# Patient Record
Sex: FEMALE | Race: WHITE | NOT HISPANIC OR LATINO | Employment: UNEMPLOYED | URBAN - METROPOLITAN AREA
[De-identification: names, ages, dates, MRNs, and addresses within clinical notes are randomized per-mention and may not be internally consistent; named-entity substitution may affect disease eponyms.]

---
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
## 2023-01-04 ENCOUNTER — PATIENT MESSAGE (OUTPATIENT)
Dept: OBSTETRICS AND GYNECOLOGY | Facility: CLINIC | Age: 18
End: 2023-01-04

## 2023-01-06 ENCOUNTER — PATIENT MESSAGE (OUTPATIENT)
Dept: OTHER | Facility: OTHER | Age: 18
End: 2023-01-06
Payer: MEDICAID

## 2023-01-11 ENCOUNTER — PROCEDURE VISIT (OUTPATIENT)
Dept: OBSTETRICS AND GYNECOLOGY | Facility: CLINIC | Age: 18
End: 2023-01-11
Payer: MEDICAID

## 2023-01-11 ENCOUNTER — ROUTINE PRENATAL (OUTPATIENT)
Dept: OBSTETRICS AND GYNECOLOGY | Facility: CLINIC | Age: 18
End: 2023-01-11
Payer: MEDICAID

## 2023-01-11 VITALS — SYSTOLIC BLOOD PRESSURE: 98 MMHG | BODY MASS INDEX: 20.1 KG/M2 | WEIGHT: 124.56 LBS | DIASTOLIC BLOOD PRESSURE: 50 MMHG

## 2023-01-11 DIAGNOSIS — Z36.2 ENCOUNTER FOR FOLLOW-UP ULTRASOUND OF FETAL ANATOMY: Primary | ICD-10-CM

## 2023-01-11 DIAGNOSIS — Z34.81 ENCOUNTER FOR SUPERVISION OF OTHER NORMAL PREGNANCY, FIRST TRIMESTER: ICD-10-CM

## 2023-01-11 DIAGNOSIS — Z3A.28 28 WEEKS GESTATION OF PREGNANCY: ICD-10-CM

## 2023-01-11 DIAGNOSIS — A74.9 CHLAMYDIA INFECTION AFFECTING PREGNANCY IN FIRST TRIMESTER: ICD-10-CM

## 2023-01-11 DIAGNOSIS — O98.211 GONORRHEA AFFECTING PREGNANCY IN FIRST TRIMESTER: ICD-10-CM

## 2023-01-11 DIAGNOSIS — O98.811 CHLAMYDIA INFECTION AFFECTING PREGNANCY IN FIRST TRIMESTER: ICD-10-CM

## 2023-01-11 PROCEDURE — 99213 OFFICE O/P EST LOW 20 MIN: CPT | Mod: TH,S$PBB,, | Performed by: ADVANCED PRACTICE MIDWIFE

## 2023-01-11 PROCEDURE — 99999 PR PBB SHADOW E&M-EST. PATIENT-LVL II: CPT | Mod: PBBFAC,,, | Performed by: ADVANCED PRACTICE MIDWIFE

## 2023-01-11 PROCEDURE — 99999 PR PBB SHADOW E&M-EST. PATIENT-LVL II: ICD-10-PCS | Mod: PBBFAC,,, | Performed by: ADVANCED PRACTICE MIDWIFE

## 2023-01-11 PROCEDURE — 87591 N.GONORRHOEAE DNA AMP PROB: CPT | Performed by: ADVANCED PRACTICE MIDWIFE

## 2023-01-11 PROCEDURE — 76805 US OB/GYN PROCEDURE (VIEWPOINT): ICD-10-PCS | Mod: 26,S$PBB,, | Performed by: OBSTETRICS & GYNECOLOGY

## 2023-01-11 PROCEDURE — 76805 OB US >/= 14 WKS SNGL FETUS: CPT | Mod: PBBFAC | Performed by: OBSTETRICS & GYNECOLOGY

## 2023-01-11 PROCEDURE — 99213 PR OFFICE/OUTPT VISIT, EST, LEVL III, 20-29 MIN: ICD-10-PCS | Mod: TH,S$PBB,, | Performed by: ADVANCED PRACTICE MIDWIFE

## 2023-01-11 PROCEDURE — 99212 OFFICE O/P EST SF 10 MIN: CPT | Mod: PBBFAC,TH,25 | Performed by: ADVANCED PRACTICE MIDWIFE

## 2023-01-15 LAB
C TRACH DNA SPEC QL NAA+PROBE: NOT DETECTED
N GONORRHOEA DNA SPEC QL NAA+PROBE: DETECTED

## 2023-01-17 ENCOUNTER — TELEPHONE (OUTPATIENT)
Dept: OBSTETRICS AND GYNECOLOGY | Facility: CLINIC | Age: 18
End: 2023-01-17
Payer: MEDICAID

## 2023-01-17 ENCOUNTER — PATIENT MESSAGE (OUTPATIENT)
Dept: OBSTETRICS AND GYNECOLOGY | Facility: CLINIC | Age: 18
End: 2023-01-17
Payer: MEDICAID

## 2023-01-17 RX ORDER — CEFTRIAXONE 500 MG/1
500 INJECTION, POWDER, FOR SOLUTION INTRAMUSCULAR; INTRAVENOUS ONCE
Status: ACTIVE | OUTPATIENT
Start: 2023-01-17

## 2023-01-17 NOTE — TELEPHONE ENCOUNTER
Attempted to contact patient regarding results and to assist with scheduling nurse visit appointment for rocephin. No answer. Left voicemail.

## 2023-01-20 ENCOUNTER — PATIENT MESSAGE (OUTPATIENT)
Dept: OTHER | Facility: OTHER | Age: 18
End: 2023-01-20
Payer: MEDICAID

## 2023-02-01 ENCOUNTER — PATIENT MESSAGE (OUTPATIENT)
Dept: OBSTETRICS AND GYNECOLOGY | Facility: CLINIC | Age: 18
End: 2023-02-01
Payer: MEDICAID

## 2023-02-03 ENCOUNTER — PATIENT MESSAGE (OUTPATIENT)
Dept: OTHER | Facility: OTHER | Age: 18
End: 2023-02-03
Payer: MEDICAID

## 2023-02-07 ENCOUNTER — LAB VISIT (OUTPATIENT)
Dept: LAB | Facility: HOSPITAL | Age: 18
End: 2023-02-07
Attending: ADVANCED PRACTICE MIDWIFE
Payer: MEDICAID

## 2023-02-07 ENCOUNTER — ROUTINE PRENATAL (OUTPATIENT)
Dept: OBSTETRICS AND GYNECOLOGY | Facility: CLINIC | Age: 18
End: 2023-02-07
Payer: MEDICAID

## 2023-02-07 ENCOUNTER — PROCEDURE VISIT (OUTPATIENT)
Dept: OBSTETRICS AND GYNECOLOGY | Facility: CLINIC | Age: 18
End: 2023-02-07
Payer: MEDICAID

## 2023-02-07 VITALS
WEIGHT: 132.69 LBS | SYSTOLIC BLOOD PRESSURE: 102 MMHG | DIASTOLIC BLOOD PRESSURE: 50 MMHG | BODY MASS INDEX: 21.42 KG/M2

## 2023-02-07 DIAGNOSIS — O98.211 GONORRHEA AFFECTING PREGNANCY IN FIRST TRIMESTER: ICD-10-CM

## 2023-02-07 DIAGNOSIS — A74.9 CHLAMYDIA INFECTION AFFECTING PREGNANCY IN FIRST TRIMESTER: ICD-10-CM

## 2023-02-07 DIAGNOSIS — Z36.2 ENCOUNTER FOR FOLLOW-UP ULTRASOUND OF FETAL ANATOMY: ICD-10-CM

## 2023-02-07 DIAGNOSIS — Z3A.28 28 WEEKS GESTATION OF PREGNANCY: ICD-10-CM

## 2023-02-07 DIAGNOSIS — O98.811 CHLAMYDIA INFECTION AFFECTING PREGNANCY IN FIRST TRIMESTER: ICD-10-CM

## 2023-02-07 DIAGNOSIS — O36.5930 POOR FETAL GROWTH AFFECTING MANAGEMENT OF MOTHER IN THIRD TRIMESTER, SINGLE OR UNSPECIFIED FETUS: ICD-10-CM

## 2023-02-07 LAB
BASOPHILS # BLD AUTO: 0.07 K/UL (ref 0–0.2)
BASOPHILS NFR BLD: 0.7 % (ref 0–1.9)
DIFFERENTIAL METHOD: ABNORMAL
EOSINOPHIL # BLD AUTO: 0.1 K/UL (ref 0–0.5)
EOSINOPHIL NFR BLD: 0.7 % (ref 0–8)
ERYTHROCYTE [DISTWIDTH] IN BLOOD BY AUTOMATED COUNT: 14.2 % (ref 11.5–14.5)
GLUCOSE SERPL-MCNC: 86 MG/DL (ref 70–140)
HCT VFR BLD AUTO: 31.6 % (ref 37–48.5)
HGB BLD-MCNC: 10.2 G/DL (ref 12–16)
IMM GRANULOCYTES # BLD AUTO: 0.13 K/UL (ref 0–0.04)
IMM GRANULOCYTES NFR BLD AUTO: 1.3 % (ref 0–0.5)
LYMPHOCYTES # BLD AUTO: 2 K/UL (ref 1–4.8)
LYMPHOCYTES NFR BLD: 20.2 % (ref 18–48)
MCH RBC QN AUTO: 29.9 PG (ref 27–31)
MCHC RBC AUTO-ENTMCNC: 32.3 G/DL (ref 32–36)
MCV RBC AUTO: 93 FL (ref 82–98)
MONOCYTES # BLD AUTO: 0.7 K/UL (ref 0.3–1)
MONOCYTES NFR BLD: 6.9 % (ref 4–15)
NEUTROPHILS # BLD AUTO: 6.9 K/UL (ref 1.8–7.7)
NEUTROPHILS NFR BLD: 70.2 % (ref 38–73)
NRBC BLD-RTO: 0 /100 WBC
PLATELET # BLD AUTO: 270 K/UL (ref 150–450)
PMV BLD AUTO: 13 FL (ref 9.2–12.9)
RBC # BLD AUTO: 3.41 M/UL (ref 4–5.4)
WBC # BLD AUTO: 9.76 K/UL (ref 3.9–12.7)

## 2023-02-07 PROCEDURE — 99999 PR PBB SHADOW E&M-EST. PATIENT-LVL II: ICD-10-PCS | Mod: PBBFAC,,, | Performed by: ADVANCED PRACTICE MIDWIFE

## 2023-02-07 PROCEDURE — 87389 HIV-1 AG W/HIV-1&-2 AB AG IA: CPT | Performed by: ADVANCED PRACTICE MIDWIFE

## 2023-02-07 PROCEDURE — 99999 PR PBB SHADOW E&M-EST. PATIENT-LVL II: CPT | Mod: PBBFAC,,, | Performed by: ADVANCED PRACTICE MIDWIFE

## 2023-02-07 PROCEDURE — 85025 COMPLETE CBC W/AUTO DIFF WBC: CPT | Performed by: ADVANCED PRACTICE MIDWIFE

## 2023-02-07 PROCEDURE — 86592 SYPHILIS TEST NON-TREP QUAL: CPT | Performed by: ADVANCED PRACTICE MIDWIFE

## 2023-02-07 PROCEDURE — 99212 OFFICE O/P EST SF 10 MIN: CPT | Mod: PBBFAC,TH,25 | Performed by: ADVANCED PRACTICE MIDWIFE

## 2023-02-07 PROCEDURE — 99213 PR OFFICE/OUTPT VISIT, EST, LEVL III, 20-29 MIN: ICD-10-PCS | Mod: TH,S$PBB,25, | Performed by: ADVANCED PRACTICE MIDWIFE

## 2023-02-07 PROCEDURE — 76816 US OB/GYN PROCEDURE (VIEWPOINT): ICD-10-PCS | Mod: 26,S$PBB,, | Performed by: OBSTETRICS & GYNECOLOGY

## 2023-02-07 PROCEDURE — 76819 FETAL BIOPHYS PROFIL W/O NST: CPT | Mod: 26,S$PBB,, | Performed by: OBSTETRICS & GYNECOLOGY

## 2023-02-07 PROCEDURE — 99213 OFFICE O/P EST LOW 20 MIN: CPT | Mod: TH,S$PBB,25, | Performed by: ADVANCED PRACTICE MIDWIFE

## 2023-02-07 PROCEDURE — 96372 THER/PROPH/DIAG INJ SC/IM: CPT | Mod: PBBFAC

## 2023-02-07 PROCEDURE — 36415 COLL VENOUS BLD VENIPUNCTURE: CPT | Performed by: ADVANCED PRACTICE MIDWIFE

## 2023-02-07 PROCEDURE — 82950 GLUCOSE TEST: CPT | Performed by: ADVANCED PRACTICE MIDWIFE

## 2023-02-07 PROCEDURE — 76820 UMBILICAL ARTERY ECHO: CPT | Mod: 26,S$PBB,, | Performed by: OBSTETRICS & GYNECOLOGY

## 2023-02-07 PROCEDURE — 76816 OB US FOLLOW-UP PER FETUS: CPT | Mod: PBBFAC | Performed by: OBSTETRICS & GYNECOLOGY

## 2023-02-07 PROCEDURE — 76820 US OB/GYN PROCEDURE (VIEWPOINT): ICD-10-PCS | Mod: 26,S$PBB,, | Performed by: OBSTETRICS & GYNECOLOGY

## 2023-02-07 PROCEDURE — 76819 US OB/GYN PROCEDURE (VIEWPOINT): ICD-10-PCS | Mod: 26,S$PBB,, | Performed by: OBSTETRICS & GYNECOLOGY

## 2023-02-07 RX ORDER — AZITHROMYCIN 500 MG/1
1000 TABLET, FILM COATED ORAL ONCE
Qty: 2 TABLET | Refills: 0 | Status: SHIPPED | OUTPATIENT
Start: 2023-02-07 | End: 2023-02-07

## 2023-02-07 RX ORDER — CEFTRIAXONE 500 MG/1
500 INJECTION, POWDER, FOR SOLUTION INTRAMUSCULAR; INTRAVENOUS ONCE
Status: COMPLETED | OUTPATIENT
Start: 2023-02-07 | End: 2023-02-07

## 2023-02-07 RX ADMIN — CEFTRIAXONE SODIUM 500 MG: 500 INJECTION, POWDER, FOR SOLUTION INTRAMUSCULAR; INTRAVENOUS at 02:02

## 2023-02-07 NOTE — PROGRESS NOTES
17 y.o. female  at 28w4d   Reports + FM, denies VB, LOF or CTX  Doing well but C/O increased in vaginal mucus.  Advised most likely due to continued gonorrhea infection.  Declined SVE states is having no pain.  S/S PTL reviewed.  Will retreat today and advised partner needs to be retreated as well. No sexual activity   TW lbs, S<D FH 24, EFW 20% with AC at 3%.  ANATOMY complete, BPP 8 of 8, MVP 4.3.  Will initiate weekly ultrasounds and repeat growth in 3 weeks   28wk labs today (A POS)   Tdap declines      Reviewed warning signs, normal FKCs,  labor precautions and how/when to call.  RTC x 1 wks, call or present sooner prn.     I spent a total of 20 minutes on the day of the visit.This includes face to face time and non-face to face time preparing to see the patient (eg, review of tests), Obtaining and/or reviewing separately obtained history, Documenting clinical information in the electronic or other health record, Independently interpreting resultsand communicating results to the patient/family/caregiver, or Care coordination.

## 2023-02-07 NOTE — PROGRESS NOTES
Two patient identifiers verified. Patient notified to wait in clinic 15 minutes after injection, patient verbalized understanding. Rocephin administered IM to patient's right ventrogluteal. Patient tolerated well.

## 2023-02-08 LAB
HIV 1+2 AB+HIV1 P24 AG SERPL QL IA: NORMAL
RPR SER QL: NORMAL

## 2023-02-15 ENCOUNTER — PATIENT MESSAGE (OUTPATIENT)
Dept: OBSTETRICS AND GYNECOLOGY | Facility: CLINIC | Age: 18
End: 2023-02-15

## 2023-02-17 ENCOUNTER — PATIENT MESSAGE (OUTPATIENT)
Dept: OTHER | Facility: OTHER | Age: 18
End: 2023-02-17
Payer: MEDICAID

## 2023-02-20 ENCOUNTER — PROCEDURE VISIT (OUTPATIENT)
Dept: OBSTETRICS AND GYNECOLOGY | Facility: CLINIC | Age: 18
End: 2023-02-20
Payer: MEDICAID

## 2023-02-20 ENCOUNTER — ROUTINE PRENATAL (OUTPATIENT)
Dept: OBSTETRICS AND GYNECOLOGY | Facility: CLINIC | Age: 18
End: 2023-02-20
Payer: MEDICAID

## 2023-02-20 VITALS — WEIGHT: 129 LBS | SYSTOLIC BLOOD PRESSURE: 108 MMHG | BODY MASS INDEX: 20.82 KG/M2 | DIASTOLIC BLOOD PRESSURE: 60 MMHG

## 2023-02-20 DIAGNOSIS — O36.5930 POOR FETAL GROWTH AFFECTING MANAGEMENT OF MOTHER IN THIRD TRIMESTER, SINGLE OR UNSPECIFIED FETUS: ICD-10-CM

## 2023-02-20 DIAGNOSIS — O99.011 ANEMIA DURING PREGNANCY IN FIRST TRIMESTER: ICD-10-CM

## 2023-02-20 DIAGNOSIS — O98.211 GONORRHEA AFFECTING PREGNANCY IN FIRST TRIMESTER: ICD-10-CM

## 2023-02-20 DIAGNOSIS — O09.93 SUPERVISION OF HIGH RISK PREGNANCY IN THIRD TRIMESTER: ICD-10-CM

## 2023-02-20 DIAGNOSIS — O36.5930 POOR FETAL GROWTH AFFECTING MANAGEMENT OF MOTHER IN THIRD TRIMESTER, SINGLE OR UNSPECIFIED FETUS: Primary | ICD-10-CM

## 2023-02-20 DIAGNOSIS — Z3A.30 30 WEEKS GESTATION OF PREGNANCY: ICD-10-CM

## 2023-02-20 PROCEDURE — 99999 PR PBB SHADOW E&M-EST. PATIENT-LVL II: ICD-10-PCS | Mod: PBBFAC,,, | Performed by: ADVANCED PRACTICE MIDWIFE

## 2023-02-20 PROCEDURE — 76820 US OB/GYN PROCEDURE (VIEWPOINT): ICD-10-PCS | Mod: 26,S$PBB,, | Performed by: OBSTETRICS & GYNECOLOGY

## 2023-02-20 PROCEDURE — 99212 OFFICE O/P EST SF 10 MIN: CPT | Mod: PBBFAC,TH | Performed by: ADVANCED PRACTICE MIDWIFE

## 2023-02-20 PROCEDURE — 76820 UMBILICAL ARTERY ECHO: CPT | Mod: PBBFAC | Performed by: OBSTETRICS & GYNECOLOGY

## 2023-02-20 PROCEDURE — 76819 US OB/GYN PROCEDURE (VIEWPOINT): ICD-10-PCS | Mod: 26,S$PBB,, | Performed by: OBSTETRICS & GYNECOLOGY

## 2023-02-20 PROCEDURE — 99213 OFFICE O/P EST LOW 20 MIN: CPT | Mod: TH,S$PBB,, | Performed by: ADVANCED PRACTICE MIDWIFE

## 2023-02-20 PROCEDURE — 90715 TDAP VACCINE 7 YRS/> IM: CPT | Mod: PBBFAC

## 2023-02-20 PROCEDURE — 90471 IMMUNIZATION ADMIN: CPT | Mod: PBBFAC

## 2023-02-20 PROCEDURE — 99213 PR OFFICE/OUTPT VISIT, EST, LEVL III, 20-29 MIN: ICD-10-PCS | Mod: TH,S$PBB,, | Performed by: ADVANCED PRACTICE MIDWIFE

## 2023-02-20 PROCEDURE — 76819 FETAL BIOPHYS PROFIL W/O NST: CPT | Mod: 26,S$PBB,, | Performed by: OBSTETRICS & GYNECOLOGY

## 2023-02-20 PROCEDURE — 99999 PR PBB SHADOW E&M-EST. PATIENT-LVL II: CPT | Mod: PBBFAC,,, | Performed by: ADVANCED PRACTICE MIDWIFE

## 2023-02-20 NOTE — PROGRESS NOTES
17 y.o. female  at 30w3d   Reports + FM, denies VB, LOF or CTX  Doing well without concerns   sono done BPP 8/8 S/D 49%  TW  lbs encouraged to increase calories especially protein  Reviewed 28wk lab results (A POS)   Tdap today    Anemia taking iron  Reviewed warning signs, normal FKCs,  labor precautions and how/when to call.  RTC x 1 wks, call or present sooner prn.     I spent a total of 15 minutes on the day of the visit.This includes face to face time and non-face to face time preparing to see the patient (eg, review of tests), Obtaining and/or reviewing separately obtained history, Documenting clinical information in the electronic or other health record, Independently interpreting resultsand communicating results to the patient/family/caregiver, or Care coordination.

## 2023-02-24 ENCOUNTER — TELEPHONE (OUTPATIENT)
Dept: OBSTETRICS AND GYNECOLOGY | Facility: CLINIC | Age: 18
End: 2023-02-24
Payer: MEDICAID

## 2023-02-24 NOTE — TELEPHONE ENCOUNTER
----- Message from Antonette Perez sent at 2/24/2023  2:31 PM CST -----  Contact: [phone]  Patient requesting a call back in regards to setting up transportation to her appt on 02/28. Please call back at [phone]. Thanks KD

## 2023-02-27 ENCOUNTER — PATIENT MESSAGE (OUTPATIENT)
Dept: OBSTETRICS AND GYNECOLOGY | Facility: CLINIC | Age: 18
End: 2023-02-27
Payer: MEDICAID

## 2023-02-28 ENCOUNTER — PATIENT MESSAGE (OUTPATIENT)
Dept: OBSTETRICS AND GYNECOLOGY | Facility: CLINIC | Age: 18
End: 2023-02-28

## 2023-02-28 ENCOUNTER — ROUTINE PRENATAL (OUTPATIENT)
Dept: OBSTETRICS AND GYNECOLOGY | Facility: CLINIC | Age: 18
End: 2023-02-28
Payer: MEDICAID

## 2023-02-28 ENCOUNTER — PROCEDURE VISIT (OUTPATIENT)
Dept: OBSTETRICS AND GYNECOLOGY | Facility: CLINIC | Age: 18
End: 2023-02-28
Payer: MEDICAID

## 2023-02-28 VITALS
WEIGHT: 131.63 LBS | SYSTOLIC BLOOD PRESSURE: 112 MMHG | DIASTOLIC BLOOD PRESSURE: 60 MMHG | BODY MASS INDEX: 21.24 KG/M2

## 2023-02-28 DIAGNOSIS — O36.5990 PREGNANCY AFFECTED BY FETAL GROWTH RESTRICTION: Primary | ICD-10-CM

## 2023-02-28 DIAGNOSIS — R10.9 ABDOMINAL PAIN IN PREGNANCY, THIRD TRIMESTER: ICD-10-CM

## 2023-02-28 DIAGNOSIS — O36.5930 POOR FETAL GROWTH AFFECTING MANAGEMENT OF MOTHER IN THIRD TRIMESTER, SINGLE OR UNSPECIFIED FETUS: ICD-10-CM

## 2023-02-28 DIAGNOSIS — O26.893 ABDOMINAL PAIN IN PREGNANCY, THIRD TRIMESTER: ICD-10-CM

## 2023-02-28 DIAGNOSIS — O23.43 UTI (URINARY TRACT INFECTION) DURING PREGNANCY, THIRD TRIMESTER: ICD-10-CM

## 2023-02-28 PROBLEM — Z3A.28 28 WEEKS GESTATION OF PREGNANCY: Status: RESOLVED | Noted: 2023-02-07 | Resolved: 2023-02-28

## 2023-02-28 PROCEDURE — 87088 URINE BACTERIA CULTURE: CPT | Performed by: ADVANCED PRACTICE MIDWIFE

## 2023-02-28 PROCEDURE — 99999 PR PBB SHADOW E&M-EST. PATIENT-LVL II: CPT | Mod: PBBFAC,,, | Performed by: ADVANCED PRACTICE MIDWIFE

## 2023-02-28 PROCEDURE — 76816 US OB/GYN PROCEDURE (VIEWPOINT): ICD-10-PCS | Mod: 26,S$PBB,, | Performed by: OBSTETRICS & GYNECOLOGY

## 2023-02-28 PROCEDURE — 87086 URINE CULTURE/COLONY COUNT: CPT | Performed by: ADVANCED PRACTICE MIDWIFE

## 2023-02-28 PROCEDURE — 76820 UMBILICAL ARTERY ECHO: CPT | Mod: 26,S$PBB,, | Performed by: OBSTETRICS & GYNECOLOGY

## 2023-02-28 PROCEDURE — 76819 US OB/GYN PROCEDURE (VIEWPOINT): ICD-10-PCS | Mod: 26,S$PBB,, | Performed by: OBSTETRICS & GYNECOLOGY

## 2023-02-28 PROCEDURE — 99214 OFFICE O/P EST MOD 30 MIN: CPT | Mod: TH,S$PBB,, | Performed by: ADVANCED PRACTICE MIDWIFE

## 2023-02-28 PROCEDURE — 87077 CULTURE AEROBIC IDENTIFY: CPT | Performed by: ADVANCED PRACTICE MIDWIFE

## 2023-02-28 PROCEDURE — 99214 PR OFFICE/OUTPT VISIT, EST, LEVL IV, 30-39 MIN: ICD-10-PCS | Mod: TH,S$PBB,, | Performed by: ADVANCED PRACTICE MIDWIFE

## 2023-02-28 PROCEDURE — 76816 OB US FOLLOW-UP PER FETUS: CPT | Mod: PBBFAC | Performed by: OBSTETRICS & GYNECOLOGY

## 2023-02-28 PROCEDURE — 76820 US OB/GYN PROCEDURE (VIEWPOINT): ICD-10-PCS | Mod: 26,S$PBB,, | Performed by: OBSTETRICS & GYNECOLOGY

## 2023-02-28 PROCEDURE — 87186 SC STD MICRODIL/AGAR DIL: CPT | Performed by: ADVANCED PRACTICE MIDWIFE

## 2023-02-28 PROCEDURE — 76819 FETAL BIOPHYS PROFIL W/O NST: CPT | Mod: 26,S$PBB,, | Performed by: OBSTETRICS & GYNECOLOGY

## 2023-02-28 PROCEDURE — 99212 OFFICE O/P EST SF 10 MIN: CPT | Mod: PBBFAC,TH,25 | Performed by: ADVANCED PRACTICE MIDWIFE

## 2023-02-28 PROCEDURE — 99999 PR PBB SHADOW E&M-EST. PATIENT-LVL II: ICD-10-PCS | Mod: PBBFAC,,, | Performed by: ADVANCED PRACTICE MIDWIFE

## 2023-02-28 RX ORDER — NITROFURANTOIN 25; 75 MG/1; MG/1
100 CAPSULE ORAL 2 TIMES DAILY
Qty: 14 CAPSULE | Refills: 0 | Status: SHIPPED | OUTPATIENT
Start: 2023-02-28 | End: 2023-03-07

## 2023-02-28 NOTE — PROGRESS NOTES
17 y.o. female  at 31w4d   Reports + FM, denies VB, LOF or CTX  Having lower abdominal pain since yesterday. Was 10/10 yesterday and has improved some but still present. Does not feel like contractions.  Speculum exam: cervix visualized C/T/H, no abnormal discharge noted  UA dip: 2+ leukocytes, positive nitrates, +1 protein, negative ketones, 250 blood  Will send for culture  RX macrobid sent to pharmacy    TW lbs   Reviewed 28wk lab results (A POS)  Anemia-iron daily  Passed GTT  Tdap given previously    FGR:   Cephalic, EFW 12%tile AC 2%tile, BPP  reassuring with normal MVP . SD ratio WNL 63% 2.94.  Will continue twice weekly BPP with weekly SD ratio  Stressed FKC    Hx gonorrhea  Needs MIQUEL next week    Reviewed warning signs, normal FKCs,  labor precautions and how/when to call.  RTC x 2 wks, call or present sooner prn.     I spent a total of 20 minutes on the day of the visit.This includes face to face time and non-face to face time preparing to see the patient (eg, review of tests), Obtaining and/or reviewing separately obtained history, Documenting clinical information in the electronic or other health record, Independently interpreting resultsand communicating results to the patient/family/caregiver, or Care coordination.

## 2023-03-02 LAB — BACTERIA UR CULT: ABNORMAL

## 2023-03-03 ENCOUNTER — PATIENT MESSAGE (OUTPATIENT)
Dept: OBSTETRICS AND GYNECOLOGY | Facility: CLINIC | Age: 18
End: 2023-03-03

## 2023-03-03 ENCOUNTER — PATIENT MESSAGE (OUTPATIENT)
Dept: OTHER | Facility: OTHER | Age: 18
End: 2023-03-03
Payer: MEDICAID

## 2023-03-04 ENCOUNTER — HOSPITAL ENCOUNTER (OUTPATIENT)
Facility: HOSPITAL | Age: 18
Discharge: HOME | End: 2023-03-05
Attending: OBSTETRICS & GYNECOLOGY | Admitting: OBSTETRICS & GYNECOLOGY
Payer: MEDICAID

## 2023-03-04 DIAGNOSIS — R10.9 FLANK PAIN: ICD-10-CM

## 2023-03-04 PROBLEM — O36.5990 PREGNANCY AFFECTED BY FETAL GROWTH RESTRICTION: Status: RESOLVED | Noted: 2023-02-28 | Resolved: 2023-03-04

## 2023-03-04 PROBLEM — O23.03 PYELONEPHRITIS AFFECTING PREGNANCY IN THIRD TRIMESTER: Status: ACTIVE | Noted: 2023-03-04

## 2023-03-04 LAB
ALBUMIN SERPL BCP-MCNC: 2.7 G/DL (ref 3.5–5.2)
ALP SERPL-CCNC: 145 U/L (ref 55–135)
ALT SERPL W/O P-5'-P-CCNC: 6 U/L (ref 10–44)
ANION GAP SERPL CALC-SCNC: 13 MMOL/L (ref 8–16)
AST SERPL-CCNC: 13 U/L (ref 10–40)
BACTERIA #/AREA URNS HPF: ABNORMAL /HPF
BASOPHILS # BLD AUTO: 0.04 K/UL (ref 0–0.2)
BASOPHILS NFR BLD: 0.3 % (ref 0–1.9)
BILIRUB SERPL-MCNC: 0.2 MG/DL (ref 0.1–1)
BILIRUB UR QL STRIP: NEGATIVE
BUN SERPL-MCNC: 5 MG/DL (ref 6–20)
CALCIUM SERPL-MCNC: 8.6 MG/DL (ref 8.7–10.5)
CHLORIDE SERPL-SCNC: 106 MMOL/L (ref 95–110)
CLARITY UR: ABNORMAL
CO2 SERPL-SCNC: 17 MMOL/L (ref 23–29)
COLOR UR: YELLOW
CREAT SERPL-MCNC: 0.7 MG/DL (ref 0.5–1.4)
DIFFERENTIAL METHOD: ABNORMAL
EOSINOPHIL # BLD AUTO: 0 K/UL (ref 0–0.5)
EOSINOPHIL NFR BLD: 0 % (ref 0–8)
ERYTHROCYTE [DISTWIDTH] IN BLOOD BY AUTOMATED COUNT: 13.2 % (ref 11.5–14.5)
EST. GFR  (NO RACE VARIABLE): >60 ML/MIN/1.73 M^2
GLUCOSE SERPL-MCNC: 68 MG/DL (ref 70–110)
GLUCOSE UR QL STRIP: NEGATIVE
HCT VFR BLD AUTO: 32 % (ref 37–48.5)
HGB BLD-MCNC: 10.6 G/DL (ref 12–16)
HGB UR QL STRIP: ABNORMAL
HYALINE CASTS #/AREA URNS LPF: 0 /LPF
IMM GRANULOCYTES # BLD AUTO: 0.16 K/UL (ref 0–0.04)
IMM GRANULOCYTES NFR BLD AUTO: 1.1 % (ref 0–0.5)
KETONES UR QL STRIP: ABNORMAL
LEUKOCYTE ESTERASE UR QL STRIP: ABNORMAL
LYMPHOCYTES # BLD AUTO: 1 K/UL (ref 1–4.8)
LYMPHOCYTES NFR BLD: 6.7 % (ref 18–48)
MCH RBC QN AUTO: 29.8 PG (ref 27–31)
MCHC RBC AUTO-ENTMCNC: 33.1 G/DL (ref 32–36)
MCV RBC AUTO: 90 FL (ref 82–98)
MICROSCOPIC COMMENT: ABNORMAL
MONOCYTES # BLD AUTO: 1.1 K/UL (ref 0.3–1)
MONOCYTES NFR BLD: 7.1 % (ref 4–15)
NEUTROPHILS # BLD AUTO: 12.6 K/UL (ref 1.8–7.7)
NEUTROPHILS NFR BLD: 84.8 % (ref 38–73)
NITRITE UR QL STRIP: POSITIVE
NRBC BLD-RTO: 0 /100 WBC
PH UR STRIP: 6 [PH] (ref 5–8)
PLATELET # BLD AUTO: 261 K/UL (ref 150–450)
PMV BLD AUTO: 12 FL (ref 9.2–12.9)
POTASSIUM SERPL-SCNC: 3.3 MMOL/L (ref 3.5–5.1)
PROT SERPL-MCNC: 7.4 G/DL (ref 6–8.4)
PROT UR QL STRIP: ABNORMAL
RBC # BLD AUTO: 3.56 M/UL (ref 4–5.4)
RBC #/AREA URNS HPF: 5 /HPF (ref 0–4)
SODIUM SERPL-SCNC: 136 MMOL/L (ref 136–145)
SP GR UR STRIP: 1.01 (ref 1–1.03)
UNIDENT CRYS URNS QL MICRO: ABNORMAL
URN SPEC COLLECT METH UR: ABNORMAL
UROBILINOGEN UR STRIP-ACNC: NEGATIVE EU/DL
WBC # BLD AUTO: 14.83 K/UL (ref 3.9–12.7)
WBC #/AREA URNS HPF: >100 /HPF (ref 0–5)
WBC CLUMPS URNS QL MICRO: ABNORMAL

## 2023-03-04 PROCEDURE — 36415 COLL VENOUS BLD VENIPUNCTURE: CPT | Performed by: OBSTETRICS & GYNECOLOGY

## 2023-03-04 PROCEDURE — 80053 COMPREHEN METABOLIC PANEL: CPT | Performed by: OBSTETRICS & GYNECOLOGY

## 2023-03-04 PROCEDURE — 87086 URINE CULTURE/COLONY COUNT: CPT | Performed by: MIDWIFE

## 2023-03-04 PROCEDURE — 87186 SC STD MICRODIL/AGAR DIL: CPT | Performed by: MIDWIFE

## 2023-03-04 PROCEDURE — 87077 CULTURE AEROBIC IDENTIFY: CPT | Performed by: MIDWIFE

## 2023-03-04 PROCEDURE — 63600175 PHARM REV CODE 636 W HCPCS: Performed by: MIDWIFE

## 2023-03-04 PROCEDURE — 87591 N.GONORRHOEAE DNA AMP PROB: CPT | Performed by: MIDWIFE

## 2023-03-04 PROCEDURE — 87088 URINE BACTERIA CULTURE: CPT | Performed by: MIDWIFE

## 2023-03-04 PROCEDURE — 99211 OFF/OP EST MAY X REQ PHY/QHP: CPT

## 2023-03-04 PROCEDURE — 81000 URINALYSIS NONAUTO W/SCOPE: CPT | Performed by: MIDWIFE

## 2023-03-04 PROCEDURE — 87040 BLOOD CULTURE FOR BACTERIA: CPT | Mod: 59 | Performed by: OBSTETRICS & GYNECOLOGY

## 2023-03-04 PROCEDURE — 25000003 PHARM REV CODE 250: Performed by: MIDWIFE

## 2023-03-04 PROCEDURE — 85025 COMPLETE CBC W/AUTO DIFF WBC: CPT | Performed by: OBSTETRICS & GYNECOLOGY

## 2023-03-04 RX ORDER — BUTORPHANOL TARTRATE 1 MG/ML
1 INJECTION INTRAMUSCULAR; INTRAVENOUS
Status: DISCONTINUED | OUTPATIENT
Start: 2023-03-04 | End: 2023-03-06 | Stop reason: HOSPADM

## 2023-03-04 RX ORDER — SODIUM CHLORIDE, SODIUM LACTATE, POTASSIUM CHLORIDE, CALCIUM CHLORIDE 600; 310; 30; 20 MG/100ML; MG/100ML; MG/100ML; MG/100ML
INJECTION, SOLUTION INTRAVENOUS CONTINUOUS
Status: DISCONTINUED | OUTPATIENT
Start: 2023-03-04 | End: 2023-03-06 | Stop reason: HOSPADM

## 2023-03-04 RX ORDER — ACETAMINOPHEN 325 MG/1
650 TABLET ORAL EVERY 6 HOURS PRN
Status: DISCONTINUED | OUTPATIENT
Start: 2023-03-04 | End: 2023-03-06 | Stop reason: HOSPADM

## 2023-03-04 RX ORDER — ONDANSETRON 8 MG/1
8 TABLET, ORALLY DISINTEGRATING ORAL EVERY 8 HOURS PRN
Status: DISCONTINUED | OUTPATIENT
Start: 2023-03-04 | End: 2023-03-06 | Stop reason: HOSPADM

## 2023-03-04 RX ADMIN — CEFTRIAXONE 1 G: 1 INJECTION, POWDER, FOR SOLUTION INTRAMUSCULAR; INTRAVENOUS at 08:03

## 2023-03-04 RX ADMIN — ACETAMINOPHEN 650 MG: 325 TABLET ORAL at 07:03

## 2023-03-04 RX ADMIN — SODIUM CHLORIDE, POTASSIUM CHLORIDE, SODIUM LACTATE AND CALCIUM CHLORIDE 1000 ML: 600; 310; 30; 20 INJECTION, SOLUTION INTRAVENOUS at 07:03

## 2023-03-04 RX ADMIN — BUTORPHANOL TARTRATE 1 MG: 1 INJECTION, SOLUTION INTRAMUSCULAR; INTRAVENOUS at 10:03

## 2023-03-04 RX ADMIN — SODIUM CHLORIDE, POTASSIUM CHLORIDE, SODIUM LACTATE AND CALCIUM CHLORIDE: 600; 310; 30; 20 INJECTION, SOLUTION INTRAVENOUS at 09:03

## 2023-03-05 LAB
C TRACH DNA SPEC QL NAA+PROBE: DETECTED
N GONORRHOEA DNA SPEC QL NAA+PROBE: DETECTED

## 2023-03-05 PROCEDURE — 99213 PR OFFICE/OUTPT VISIT, EST, LEVL III, 20-29 MIN: ICD-10-PCS | Mod: TH,25,, | Performed by: MIDWIFE

## 2023-03-05 PROCEDURE — 99232 PR SUBSEQUENT HOSPITAL CARE,LEVL II: ICD-10-PCS | Mod: 25,,, | Performed by: OBSTETRICS & GYNECOLOGY

## 2023-03-05 PROCEDURE — 59025 FETAL NON-STRESS TEST: CPT

## 2023-03-05 PROCEDURE — 25000003 PHARM REV CODE 250: Performed by: MIDWIFE

## 2023-03-05 PROCEDURE — 63600175 PHARM REV CODE 636 W HCPCS: Performed by: MIDWIFE

## 2023-03-05 PROCEDURE — 99232 SBSQ HOSP IP/OBS MODERATE 35: CPT | Mod: 25,,, | Performed by: OBSTETRICS & GYNECOLOGY

## 2023-03-05 PROCEDURE — 99213 OFFICE O/P EST LOW 20 MIN: CPT | Mod: TH,25,, | Performed by: MIDWIFE

## 2023-03-05 PROCEDURE — 59025 FETAL NON-STRESS TEST: CPT | Mod: 26,,, | Performed by: MIDWIFE

## 2023-03-05 PROCEDURE — 59025 OBTAIN FETAL NONSTRESS TEST (NST): ICD-10-PCS | Mod: 26,,, | Performed by: MIDWIFE

## 2023-03-05 RX ADMIN — CEFTRIAXONE 1 G: 1 INJECTION, POWDER, FOR SOLUTION INTRAMUSCULAR; INTRAVENOUS at 07:03

## 2023-03-05 RX ADMIN — BUTORPHANOL TARTRATE 1 MG: 1 INJECTION, SOLUTION INTRAMUSCULAR; INTRAVENOUS at 05:03

## 2023-03-05 RX ADMIN — ACETAMINOPHEN 650 MG: 325 TABLET ORAL at 01:03

## 2023-03-05 RX ADMIN — SODIUM CHLORIDE, POTASSIUM CHLORIDE, SODIUM LACTATE AND CALCIUM CHLORIDE: 600; 310; 30; 20 INJECTION, SOLUTION INTRAVENOUS at 09:03

## 2023-03-05 RX ADMIN — BUTORPHANOL TARTRATE 1 MG: 1 INJECTION, SOLUTION INTRAMUSCULAR; INTRAVENOUS at 02:03

## 2023-03-05 NOTE — ASSESSMENT & PLAN NOTE
OB triage  Urine culture on 2/28/23 + for E.Coli. patient states she never picked up antibiotics.   Temp 101F with bilateral CVA tenderness    Called Dr. Seo and reviewed POC. Will begin IV Rocephin & IV hydration. CBC, CMP, blood cultures, and urine culture ordered.

## 2023-03-05 NOTE — SUBJECTIVE & OBJECTIVE
Obstetric HPI:  Patient reports None contractions, active fetal movement, absent vaginal bleeding , absent loss of fluid   Pt feels better but still reports bilateral lower back/flank pains.  Overall improved.     Objective:     Vital Signs (Most Recent):  Temp: 98.5 °F (36.9 °C) (03/05/23 0800)  Pulse: 94 (03/05/23 0700)  Resp: 18 (03/05/23 0800)  BP: (!) 95/39 (03/05/23 0700)  SpO2: 97 % (03/05/23 0700)   Vital Signs (24h Range):  Temp:  [98.5 °F (36.9 °C)-103.3 °F (39.6 °C)] 98.5 °F (36.9 °C)  Pulse:  [] 94  Resp:  [18-20] 18  SpO2:  [93 %-100 %] 97 %  BP: ()/(37-78) 95/39     Weight: 59.4 kg (131 lb)  Body mass index is 21.14 kg/m².    FHT: Cat 1 (reassuring)  TOCO:     No intake or output data in the 24 hours ending 03/05/23 1240      Significant Labs:  Recent Lab Results         03/04/23  1941   03/04/23  1740        Albumin 2.7         Alkaline Phosphatase 145         ALT 6         Anion Gap 13         Appearance, UA   Hazy       AST 13         Bacteria, UA   Occasional       Baso # 0.04         Basophil % 0.3         Bilirubin (UA)   Negative       BILIRUBIN TOTAL 0.2  Comment: For infants and newborns, interpretation of results should be based  on gestational age, weight and in agreement with clinical  observations.    Premature Infant recommended reference ranges:  Up to 24 hours.............<8.0 mg/dL  Up to 48 hours............<12.0 mg/dL  3-5 days..................<15.0 mg/dL  6-29 days.................<15.0 mg/dL           BUN 5         Calcium 8.6         Chloride 106         CO2 17         Color, UA   Yellow       Creatinine 0.7         Differential Method Automated         eGFR >60         Eos # 0.0         Eosinophil % 0.0         Glucose 68         Glucose, UA   Negative       Gran # (ANC) 12.6         Gran % 84.8         Hematocrit 32.0         Hemoglobin 10.6         Hyaline Casts, UA   0       Immature Grans (Abs) 0.16  Comment: Mild elevation in immature granulocytes is non  specific and   can be seen in a variety of conditions including stress response,   acute inflammation, trauma and pregnancy. Correlation with other   laboratory and clinical findings is essential.           Immature Granulocytes 1.1         Ketones, UA   1+       Leukocytes, UA   3+       Lymph # 1.0         Lymph % 6.7         MCH 29.8         MCHC 33.1         MCV 90         Microscopic Comment   SEE COMMENT  Comment: Other formed elements not mentioned in the report are not   present in the microscopic examination.          Mono # 1.1         Mono % 7.1         MPV 12.0         NITRITE UA   Positive       nRBC 0         Occult Blood UA   Trace       pH, UA   6.0       Platelets 261         Potassium 3.3         PROTEIN TOTAL 7.4         Protein, UA   1+  Comment: Recommend a 24 hour urine protein or a urine   protein/creatinine ratio if globulin induced proteinuria is  clinically suspected.         RBC 3.56         RBC, UA   5       RDW 13.2         Sodium 136         Specific [place], UA   1.015       Specimen UA   Urine, Clean Catch       Unclass Peri UA   Occasional       UROBILINOGEN UA   Negative       WBC Clumps, UA   Many       WBC, UA   >100       WBC 14.83                 Physical Exam:   Constitutional: She is oriented to person, place, and time. She appears well-developed and well-nourished. No distress.       Cardiovascular:  Normal rate, regular rhythm and normal heart sounds.             Pulmonary/Chest: Effort normal and breath sounds normal.        Abdominal: Soft. Bowel sounds are normal. She exhibits no distension. There is no abdominal tenderness. There is no right CVA tenderness and no left CVA tenderness.             Musculoskeletal: Normal range of motion and moves all extremeties. No tenderness or edema.       Neurological: She is alert and oriented to person, place, and time.    Skin: Skin is warm and dry.    Psychiatric: She has a normal mood and affect. Her behavior is normal. Thought  content normal.     Review of Systems

## 2023-03-05 NOTE — H&P
[facility] - Labor & Delivery  Obstetrics  History & Physical    Patient Name: Carina Buchanan  MRN: [id number]  Admission Date: 3/4/2023  Primary Care Provider: Primary Doctor No    Subjective:     Principal Problem:Pyelonephritis affecting pregnancy in third trimester    History of Present Illness:   32w1d c/o lower abdominal pain, flank pain, and fever      Obstetric HPI:  Patient reports some cramping, decreased  fetal movement since she started to feel unwell 2 days ago, No vaginal bleeding , No loss of fluid     This pregnancy has been complicated by:  Poor fetal growth  Gonorrhea  Chlamydia  UTI-never picked up abx    OB History    Para Term  AB Living   2 1 1 0 0 1   SAB IAB Ectopic Multiple Live Births   0 0 0 0 1      # Outcome Date GA Lbr Ti/2nd Weight Sex Delivery Anes PTL Lv   2 Current            1 Term 20 38w0d   M Vag-Spont EPI  ANGELA      Name: Justen     History reviewed. No pertinent past medical history.  History reviewed. No pertinent surgical history.    Facility-Administered Medications Prior to Admission   Medication    cefTRIAXone injection 500 mg     PTA Medications   Medication Sig    ferrous sulfate 324 mg (65 mg iron) TbEC Take 1 tablet (324 mg total) by mouth once daily.    nitrofurantoin, macrocrystal-monohydrate, (MACROBID) 100 MG capsule Take 1 capsule (100 mg total) by mouth 2 (two) times daily. for 7 days    prenatal vit103-iron fum-folic () 27 mg iron- 1 mg Tab Take 1 tablet by mouth once daily.       Review of patient's allergies indicates:  No Known Allergies     Family History       Problem Relation (Age of Onset)    Asthma Mother, Sister, Brother    Diabetes Mother    Hypertension Mother    Migraines Sister          Tobacco Use    Smoking status: Never     Passive exposure: Never    Smokeless tobacco: Never   Substance and Sexual Activity    Alcohol use: No    Drug use: No    Sexual activity: Never     Review of Systems   Constitutional:   Positive for chills and fever.   Gastrointestinal:  Positive for abdominal pain (lower abdominal pain).   Genitourinary:  Negative for vaginal bleeding and vaginal discharge.   Musculoskeletal:  Positive for back pain.   All other systems reviewed and are negative.   Objective:     Vital Signs (Most Recent):  Temp: 99.6 °F (37.6 °C) (23 1800)  Pulse: 103 (23 1751)  Resp: 20 (23 1800)  BP: (!) 105/57 (23 1751)   Vital Signs (24h Range):  Temp:  [99.6 °F (37.6 °C)] 99.6 °F (37.6 °C)  Pulse:  [103] 103  Resp:  [20] 20  BP: (105)/(57) 105/57     Weight: 59.4 kg (131 lb)  Body mass index is 21.14 kg/m².    FHT: Cat 2 (reassuring) tachycardic. Moderate BTBV with + accels.   TOCO:  Irritability     Physical Exam:   Constitutional: She is oriented to person, place, and time. She appears well-developed and well-nourished. She appears distressed (shivering).    HENT:   Head: Normocephalic and atraumatic.    Eyes: Conjunctivae and EOM are normal.     Cardiovascular:  Normal rate.      Exam reveals no edema.        Pulmonary/Chest: Effort normal. No respiratory distress.        Abdominal: Soft. She exhibits no distension. There is abdominal tenderness (lower abdominal tenderness).     Genitourinary:    Uterus normal.      Genitourinary Comments: Bilateral CVA tenderness             Musculoskeletal: Normal range of motion and moves all extremeties.       Neurological: She is alert and oriented to person, place, and time.    Skin: Skin is warm and dry.    Psychiatric: She has a normal mood and affect. Her behavior is normal. Judgment and thought content normal.          Significant Labs:  Lab Results   Component Value Date    GROUPTRH A POS 10/13/2022    HEPBSAG Non-reactive 10/13/2022       I have personallly reviewed all pertinent lab results from the last 24 hours.    Assessment/Plan:     17 y.o. female  at 32w1d for:    * Pyelonephritis affecting pregnancy in third trimester  OB triage  Urine  culture on 2/28/23 + for E.Coli. patient states she never picked up antibiotics.   Temp 101F with bilateral CVA tenderness    Called Dr. Seo and reviewed POC. Will begin IV Rocephin & IV hydration. CBC, CMP, blood cultures, and urine culture ordered.     Poor fetal growth affecting management of mother in third trimester  Continuous EFM/TOCO    Gonorrhea affecting pregnancy in first trimester  Gen probes collected for MIQUEL        Shirley Lorenzo CNM  Obstetrics  [facility] - Labor & Delivery

## 2023-03-05 NOTE — PROGRESS NOTES
[facility] - Labor & Delivery  Obstetrics  Antepartum Progress Note    Patient Name: Carina Buchanan  MRN: [id number]  Admission Date: 3/4/2023  Hospital Length of Stay: 0 days  Attending Physician: Zak Seo MD  Primary Care Provider: Primary Doctor No    Subjective:     Principal Problem:Pyelonephritis affecting pregnancy in third trimester    HPI:   32w1d c/o lower abdominal pain, flank pain, and fever      Hospital Course:  OB triage  Urine culture on 23 + for E.Coli. patient states she never picked up antibiotics.   Temp 101F with bilateral CVA tenderness    Called Dr. Seo and reviewed POC. Will begin IV Rocephin & IV hydration. CBC, CMP, blood cultures, and urine culture ordered.       Obstetric HPI:  Patient reports None contractions, active fetal movement, absent vaginal bleeding , absent loss of fluid   Pt feels better but still reports bilateral lower back/flank pains.  Overall improved.     Objective:     Vital Signs (Most Recent):  Temp: 98.5 °F (36.9 °C) (23 0800)  Pulse: 94 (23 0700)  Resp: 18 (23 0800)  BP: (!) 95/39 (23 0700)  SpO2: 97 % (23 0700)   Vital Signs (24h Range):  Temp:  [98.5 °F (36.9 °C)-103.3 °F (39.6 °C)] 98.5 °F (36.9 °C)  Pulse:  [] 94  Resp:  [18-20] 18  SpO2:  [93 %-100 %] 97 %  BP: ()/(37-78) 95/39     Weight: 59.4 kg (131 lb)  Body mass index is 21.14 kg/m².    FHT: Cat 1 (reassuring)  TOCO:     No intake or output data in the 24 hours ending 23 1240      Significant Labs:  Recent Lab Results         23  1941   23  1740        Albumin 2.7         Alkaline Phosphatase 145         ALT 6         Anion Gap 13         Appearance, UA   Hazy       AST 13         Bacteria, UA   Occasional       Baso # 0.04         Basophil % 0.3         Bilirubin (UA)   Negative       BILIRUBIN TOTAL 0.2  Comment: For infants and newborns, interpretation of results should be based  on gestational age, weight and in agreement with  clinical  observations.    Premature Infant recommended reference ranges:  Up to 24 hours.............<8.0 mg/dL  Up to 48 hours............<12.0 mg/dL  3-5 days..................<15.0 mg/dL  6-29 days.................<15.0 mg/dL           BUN 5         Calcium 8.6         Chloride 106         CO2 17         Color, UA   Yellow       Creatinine 0.7         Differential Method Automated         eGFR >60         Eos # 0.0         Eosinophil % 0.0         Glucose 68         Glucose, UA   Negative       Gran # (ANC) 12.6         Gran % 84.8         Hematocrit 32.0         Hemoglobin 10.6         Hyaline Casts, UA   0       Immature Grans (Abs) 0.16  Comment: Mild elevation in immature granulocytes is non specific and   can be seen in a variety of conditions including stress response,   acute inflammation, trauma and pregnancy. Correlation with other   laboratory and clinical findings is essential.           Immature Granulocytes 1.1         Ketones, UA   1+       Leukocytes, UA   3+       Lymph # 1.0         Lymph % 6.7         MCH 29.8         MCHC 33.1         MCV 90         Microscopic Comment   SEE COMMENT  Comment: Other formed elements not mentioned in the report are not   present in the microscopic examination.          Mono # 1.1         Mono % 7.1         MPV 12.0         NITRITE UA   Positive       nRBC 0         Occult Blood UA   Trace       pH, UA   6.0       Platelets 261         Potassium 3.3         PROTEIN TOTAL 7.4         Protein, UA   1+  Comment: Recommend a 24 hour urine protein or a urine   protein/creatinine ratio if globulin induced proteinuria is  clinically suspected.         RBC 3.56         RBC, UA   5       RDW 13.2         Sodium 136         Specific [place], UA   1.015       Specimen UA   Urine, Clean Catch       Unclass Peri UA   Occasional       UROBILINOGEN UA   Negative       WBC Clumps, UA   Many       WBC, UA   >100       WBC 14.83                 Physical Exam:   Constitutional: She  is oriented to person, place, and time. She appears well-developed and well-nourished. No distress.       Cardiovascular:  Normal rate, regular rhythm and normal heart sounds.             Pulmonary/Chest: Effort normal and breath sounds normal.        Abdominal: Soft. Bowel sounds are normal. She exhibits no distension. There is no abdominal tenderness. There is no right CVA tenderness and no left CVA tenderness.             Musculoskeletal: Normal range of motion and moves all extremeties. No tenderness or edema.       Neurological: She is alert and oriented to person, place, and time.    Skin: Skin is warm and dry.    Psychiatric: She has a normal mood and affect. Her behavior is normal. Thought content normal.     Review of Systems    Assessment/Plan:     17 y.o. female  at 32w2d for:    * Pyelonephritis affecting pregnancy in third trimester  HD #2   32w2d  Pt symptomatically improving but with overall Tmax 103.3 at 0512 this AM.  Urine and blood cultures in process.  Await results for antibiotic guidance. Recommend IV antibiotics for 48-72 hrs and will consider discharge with oral antibiotics once 24 hrs afebrile.  Pt counseled on management plan and discharge goals.        Poor fetal growth affecting management of mother in third trimester  Continuous EFM/TOCO    Gonorrhea affecting pregnancy in first trimester  Gen probes collected for MIQUEL          Zak Seo MD  Obstetrics  [facility] - [facility] Delivery

## 2023-03-05 NOTE — PROCEDURES
"Carina Buchanan is a 17 y.o. female patient.    Temp: 100.1 °F (37.8 °C) (03/05/23 0125)  Pulse: (!) 132 (03/04/23 1900)  Resp: 20 (03/04/23 1800)  BP: (!) 105/57 (03/04/23 1751)  SpO2: 99 % (03/04/23 1900)  Weight: 59.4 kg (131 lb) (03/04/23 1800)  Height: 5' 6" (167.6 cm) (03/04/23 1800)       Obtain Fetal nonstress test (NST)    Date/Time: 3/5/2023 1:15 AM  Performed by: Shirley Lorenzo CNM  Authorized by: Shirley Lorenzo CNM     Nonstress Test:     Variability:  6-25 BPM    Decelerations:  None    Accelerations:  15 bpm    Acoustic Stimulator: No      Baseline:  135    Uterine Irritability: Yes    Biophysical Profile:     Nonstress Test Interpretation: reactive      Overall Impression:  Reassuring  Post-procedure:     Patient tolerance:  Patient tolerated the procedure well with no immediate complications    3/5/2023    "

## 2023-03-05 NOTE — SUBJECTIVE & OBJECTIVE
Obstetric HPI:  Patient reports some cramping, decreased  fetal movement since she started to feel unwell 2 days ago, No vaginal bleeding , No loss of fluid     This pregnancy has been complicated by:  Poor fetal growth  Gonorrhea  Chlamydia  UTI-never picked up abx    OB History    Para Term  AB Living   2 1 1 0 0 1   SAB IAB Ectopic Multiple Live Births   0 0 0 0 1      # Outcome Date GA Lbr Ti/2nd Weight Sex Delivery Anes PTL Lv   2 Current            1 Term 20 38w0d   M Vag-Spont EPI  ANGELA      Name: Justen     History reviewed. No pertinent past medical history.  History reviewed. No pertinent surgical history.    Facility-Administered Medications Prior to Admission   Medication    cefTRIAXone injection 500 mg     PTA Medications   Medication Sig    ferrous sulfate 324 mg (65 mg iron) TbEC Take 1 tablet (324 mg total) by mouth once daily.    nitrofurantoin, macrocrystal-monohydrate, (MACROBID) 100 MG capsule Take 1 capsule (100 mg total) by mouth 2 (two) times daily. for 7 days    prenatal vit103-iron fum-folic () 27 mg iron- 1 mg Tab Take 1 tablet by mouth once daily.       Review of patient's allergies indicates:  No Known Allergies     Family History       Problem Relation (Age of Onset)    Asthma Mother, Sister, Brother    Diabetes Mother    Hypertension Mother    Migraines Sister          Tobacco Use    Smoking status: Never     Passive exposure: Never    Smokeless tobacco: Never   Substance and Sexual Activity    Alcohol use: No    Drug use: No    Sexual activity: Never     Review of Systems   Constitutional:  Positive for chills and fever.   Gastrointestinal:  Positive for abdominal pain (lower abdominal pain).   Genitourinary:  Negative for vaginal bleeding and vaginal discharge.   Musculoskeletal:  Positive for back pain.   All other systems reviewed and are negative.   Objective:     Vital Signs (Most Recent):  Temp: 99.6 °F (37.6 °C) (23 1800)  Pulse: 103 (23  1751)  Resp: 20 (03/04/23 1800)  BP: (!) 105/57 (03/04/23 1751)   Vital Signs (24h Range):  Temp:  [99.6 °F (37.6 °C)] 99.6 °F (37.6 °C)  Pulse:  [103] 103  Resp:  [20] 20  BP: (105)/(57) 105/57     Weight: 59.4 kg (131 lb)  Body mass index is 21.14 kg/m².    FHT: Cat 2 (reassuring) tachycardic. Moderate BTBV with + accels.   TOCO:  Irritability     Physical Exam:   Constitutional: She is oriented to person, place, and time. She appears well-developed and well-nourished. She appears distressed (shivering).    HENT:   Head: Normocephalic and atraumatic.    Eyes: Conjunctivae and EOM are normal.     Cardiovascular:  Normal rate.      Exam reveals no edema.        Pulmonary/Chest: Effort normal. No respiratory distress.        Abdominal: Soft. She exhibits no distension. There is abdominal tenderness (lower abdominal tenderness).     Genitourinary:    Uterus normal.      Genitourinary Comments: Bilateral CVA tenderness             Musculoskeletal: Normal range of motion and moves all extremeties.       Neurological: She is alert and oriented to person, place, and time.    Skin: Skin is warm and dry.    Psychiatric: She has a normal mood and affect. Her behavior is normal. Judgment and thought content normal.          Significant Labs:  Lab Results   Component Value Date    GROUPTRH A POS 10/13/2022    HEPBSAG Non-reactive 10/13/2022       I have personallly reviewed all pertinent lab results from the last 24 hours.

## 2023-03-05 NOTE — HOSPITAL COURSE
OB triage  Urine culture on 2/28/23 + for E.Coli. patient states she never picked up antibiotics.   Temp 101F with bilateral CVA tenderness    Called Dr. Seo and reviewed POC. Will begin IV Rocephin & IV hydration. CBC, CMP, blood cultures, and urine culture ordered.     3/6/23 2010- Pt requesting to leave. States she cannot stay any longer. I discussed with her the risk of incomplete treatment including further worsening pyelonephritis, risk of sepsis, and possible harm or death to her self and or her baby. Pt still reports desire to leave and AMA form was obtained with patient acknowledging she understood the risk of leaving and refusing medical treatment. Pt left unit @ 2010.

## 2023-03-05 NOTE — ASSESSMENT & PLAN NOTE
HD #2   32w2d  Pt symptomatically improving but with overall Tmax 103.3 at 0512 this AM.  Urine and blood cultures in process.  Await results for antibiotic guidance. Recommend IV antibiotics for 48-72 hrs and will consider discharge with oral antibiotics once 24 hrs afebrile.  Pt counseled on management plan and discharge goals.

## 2023-03-06 VITALS
OXYGEN SATURATION: 100 % | HEART RATE: 104 BPM | TEMPERATURE: 99 F | RESPIRATION RATE: 18 BRPM | WEIGHT: 131 LBS | SYSTOLIC BLOOD PRESSURE: 107 MMHG | BODY MASS INDEX: 21.05 KG/M2 | HEIGHT: 66 IN | DIASTOLIC BLOOD PRESSURE: 65 MMHG

## 2023-03-06 NOTE — DISCHARGE SUMMARY
[facility] - Labor & Delivery  Obstetrics  Discharge Summary      Patient Name: Carina Buchanan  MRN: [id number]  Admission Date: 3/4/2023  Hospital Length of Stay: 0 days  Discharge Date and Time:  2023 5:42 AM  Attending Physician: Zak Seo MD   Discharging Provider: Jennifer Webb CNM   Primary Care Provider: Primary Doctor No    HPI:  32w1d c/o lower abdominal pain, flank pain, and fever      FHT: 150 Cat 1 (reassuring)  TOCO:  none  * No surgery found *     Hospital Course:   OB triage  Urine culture on 23 + for E.Coli. patient states she never picked up antibiotics.   Temp 101F with bilateral CVA tenderness    Called Dr. Seo and reviewed POC. Will begin IV Rocephin & IV hydration. CBC, CMP, blood cultures, and urine culture ordered.     3/6/23 2010- Pt requesting to leave. States she cannot stay any longer. I discussed with her the risk of incomplete treatment including further worsening pyelonephritis, risk of sepsis, and possible harm or death to her self and or her baby. Pt still reports desire to leave and AMA form was obtained with patient acknowledging she understood the risk of leaving and refusing medical treatment. Pt left unit @ .           Final Active Diagnoses:    Diagnosis Date Noted POA    PRINCIPAL PROBLEM:  Pyelonephritis affecting pregnancy in third trimester [O23.03] 2023 Yes    Poor fetal growth affecting management of mother in third trimester [O36.5930] 2023 Yes    Gonorrhea affecting pregnancy in first trimester [O98.211] 10/19/2022 Yes      Problems Resolved During this Admission:        Significant Diagnostic Studies: Labs: All labs within the past 24 hours have been reviewed        Immunizations     None          This patient has no babies on file.  Pending Diagnostic Studies:     None          Discharged Condition: against medical advice    Disposition: Left Against Medical Advice    Follow Up:    Patient Instructions:   No discharge procedures  on file.  Medications:  Current Discharge Medication List      CONTINUE these medications which have NOT CHANGED    Details   ferrous sulfate 324 mg (65 mg iron) TbEC Take 1 tablet (324 mg total) by mouth once daily.  Qty: 30 tablet, Refills: 5    Associated Diagnoses: Anemia during pregnancy in first trimester      nitrofurantoin, macrocrystal-monohydrate, (MACROBID) 100 MG capsule Take 1 capsule (100 mg total) by mouth 2 (two) times daily. for 7 days  Qty: 14 capsule, Refills: 0    Associated Diagnoses: Pregnancy affected by fetal growth restriction; UTI (urinary tract infection) during pregnancy, third trimester      prenatal vit103-iron fum-folic () 27 mg iron- 1 mg Tab Take 1 tablet by mouth once daily.  Qty: 30 tablet, Refills: 11    Associated Diagnoses: Encounter for supervision of other normal pregnancy, first trimester             Jennifer Webb CNM  Obstetrics  [facility] - [facility]

## 2023-03-07 LAB — BACTERIA UR CULT: ABNORMAL

## 2023-03-10 LAB
BACTERIA BLD CULT: NORMAL
BACTERIA BLD CULT: NORMAL

## 2023-03-21 ENCOUNTER — HOSPITAL ENCOUNTER (EMERGENCY)
Facility: HOSPITAL | Age: 18
Discharge: HOME | End: 2023-03-21
Attending: EMERGENCY MEDICINE
Payer: MEDICAID

## 2023-03-21 VITALS
OXYGEN SATURATION: 100 % | DIASTOLIC BLOOD PRESSURE: 60 MMHG | RESPIRATION RATE: 16 BRPM | TEMPERATURE: 99 F | WEIGHT: 131.38 LBS | SYSTOLIC BLOOD PRESSURE: 110 MMHG | HEART RATE: 80 BPM | BODY MASS INDEX: 21.89 KG/M2 | HEIGHT: 65 IN

## 2023-03-21 DIAGNOSIS — N75.1 ABSCESS OF LEFT BARTHOLIN'S GLAND: Primary | ICD-10-CM

## 2023-03-21 DIAGNOSIS — R10.2 VAGINAL PAIN: ICD-10-CM

## 2023-03-21 PROCEDURE — 56420 I&D BARTHOLINS GLAND ABSCESS: CPT

## 2023-03-21 PROCEDURE — 25000003 PHARM REV CODE 250: Performed by: EMERGENCY MEDICINE

## 2023-03-21 PROCEDURE — 99284 EMERGENCY DEPT VISIT MOD MDM: CPT | Mod: 25

## 2023-03-21 RX ORDER — LIDOCAINE HYDROCHLORIDE 20 MG/ML
5 INJECTION, SOLUTION INFILTRATION; PERINEURAL
Status: COMPLETED | OUTPATIENT
Start: 2023-03-21 | End: 2023-03-21

## 2023-03-21 RX ADMIN — LIDOCAINE HYDROCHLORIDE 5 ML: 20 INJECTION, SOLUTION INFILTRATION; PERINEURAL at 03:03

## 2023-03-21 NOTE — ED PROVIDER NOTES
SCRIBE #1 NOTE: I, Alejandro Calix, am scribing for, and in the presence of, Nasim Plascencia MD. I have scribed the entire note.       History     Chief Complaint   Patient presents with    Cyst     Pt reports vaginal cyst. Pt states she had a vaginal cyst with her previous child. [employer] made her nervous thinking she was about to die so she wanted to get checked out     HPI  3/21/2023, 3:23 AM  History obtained from the patient    HPI:  Carina Buchanan is a 17 y.o. female 34 weeks pregnant who presents to the [facility] emergency department for evaluation of a vaginal cyst which onset two days PTA. Pt explained the cyst was not painful until tonight. No associated symptoms reported. No exacerbating factors reported. No prior treatment reported. No other complaints or concerns.     Arrival mode: Personal vehicle      PCP: Primary Doctor No    Review of patient's allergies indicates:  No Known Allergies   No past medical history on file.  No past surgical history on file.    Family History   Problem Relation Age of Onset    Hypertension Mother     Diabetes Mother     Asthma Mother     Migraines Sister     Asthma Sister     Asthma Brother      Social History     Tobacco Use    Smoking status: Never     Passive exposure: Never    Smokeless tobacco: Never   Substance and Sexual Activity    Alcohol use: No    Drug use: No    Sexual activity: Never      Review of Systems     Review of Systems   Constitutional: Negative.    HENT: Negative.     Eyes: Negative.    Respiratory: Negative.     Cardiovascular: Negative.    Gastrointestinal: Negative.    Endocrine: Negative.    Genitourinary:  Positive for vaginal pain (cyst).   Musculoskeletal: Negative.    Skin: Negative.    Allergic/Immunologic: Negative.    Neurological: Negative.    Hematological: Negative.    Psychiatric/Behavioral: Negative.     All other systems reviewed and are negative.     Physical Exam     Initial Vitals   BP Pulse Resp Temp SpO2   03/21/23  0233 23 0233 23 0233 23 0343 23 0233   (!) 111/56 89 18 98.9 °F (37.2 °C) 100 %      MAP       --                 Physical Exam  Nursing notes and vital signs reviewed.  Constitutional: Patient is in moderate distress.   Head: Normocephalic. Atraumatic.   Eyes: Conjunctivae are not pale. No scleral icterus.   ENT: Mucous membranes moist.   Neck: Supple.   Cardiovascular: Regular rate. Regular rhythm.   Pulmonary: No respiratory distress.   Abdominal: Non-distended.   Musculoskeletal: Moves all extremities. No obvious deformities. No edema.   Skin: Warm and dry.   Neurological:  Alert, awake, and appropriate. Normal speech. No acute lateralizing neurologic deficits appreciated.   Psychiatric: Normal affect.    Pelvic: A female chaperone was present for this examination. White discharge. Golf ball sized Bartholin's cyst on left labia.        ED Course   I & D - Incision and Drainage    Date/Time: 3/21/2023 3:37 AM  Location procedure was performed: [facility] EMERGENCY DEPARTMENT  Performed by: Nasim Plascencia MD  Authorized by: Nasim Plascencia MD   Consent Done: Yes  Consent: Verbal consent obtained.  Consent given by: patient  Patient identity confirmed: , MRN and name  Type: cyst  Body area: anogenital  Location details: Bartholin's gland  Anesthesia: local infiltration    Anesthesia:  Local Anesthetic: lidocaine 1% without epinephrine  Anesthetic total: 1 mL    Patient sedated: no  Description of findings: Purulent abscess   Scalpel size: 11  Incision type: single straight  Incision depth: submucosal  Complexity: complex  Drainage: purulent  Drainage amount: copious  Wound treatment: incision, drainage, expression of material, wound left open and deloculation  Complications: No  Specimens: No  Implants: No  Patient tolerance: Patient tolerated the procedure well with no immediate complications    Incision depth: submucosal      Vitals:    23 0233 23 0343   BP: (!) 111/56  "110/60   Pulse: 89 80   Resp: 18 16   Temp:  98.9 °F (37.2 °C)   TempSrc:  Oral   SpO2: 100% 100%   Weight: 59.6 kg (131 lb 6.3 oz)    Height: 5' 5" (1.651 m)      Lab Results Interpreted as Abnormal:  Labs Reviewed - No data to display   All Lab Results:  Results for orders placed or performed during the hospital encounter of 03/04/23   C. trachomatis/N. gonorrhoeae by AMP DNA [facility]; Vagina    Specimen: Genital   Result Value Ref Range    Chlamydia, Amplified DNA Detected (A) Not Detected    N gonorrhoeae, amplified DNA Detected (A) Not Detected   Urine Culture High Risk    Specimen: Urine, Clean Catch   Result Value Ref Range    Urine Culture, Routine ESCHERICHIA COLI  >100,000 cfu/ml   (A)        Susceptibility    Escherichia coli - CULTURE, URINE     Amp/Sulbactam <=8/4 Sensitive mcg/mL     Ampicillin <=8 Sensitive mcg/mL     Amox/K Clav'ate <=8/4 Sensitive mcg/mL     Ceftriaxone <=1 Sensitive mcg/mL     Cefazolin <=2 Sensitive mcg/mL     Ciprofloxacin <=1 Sensitive mcg/mL     Cefepime <=2 Sensitive mcg/mL     Ertapenem <=0.5 Sensitive mcg/mL     Nitrofurantoin <=32 Sensitive mcg/mL     Gentamicin <=4 Sensitive mcg/mL     Levofloxacin <=2 Sensitive mcg/mL     Meropenem <=1 Sensitive mcg/mL     Piperacillin/Tazo <=16 Sensitive mcg/mL     Trimeth/Sulfa >2/38 Resistant mcg/mL     Tobramycin <=4 Sensitive mcg/mL   Blood culture    Specimen: Blood   Result Value Ref Range    Blood Culture, Routine No growth after 5 days.    Blood culture    Specimen: Blood   Result Value Ref Range    Blood Culture, Routine No growth after 5 days.    Urinalysis   Result Value Ref Range    Specimen UA Urine, Clean Catch     Color, UA Yellow Yellow, Straw, Cee    Appearance, UA Hazy (A) Clear    pH, UA 6.0 5.0 - 8.0    Specific Gravity, UA 1.015 1.005 - 1.030    Protein, UA 1+ (A) Negative    Glucose, UA Negative Negative    Ketones, UA 1+ (A) Negative    Bilirubin (UA) Negative Negative    Occult Blood UA Trace (A) Negative    " Nitrite, UA Positive (A) Negative    Urobilinogen, UA Negative <2.0 EU/dL    Leukocytes, UA 3+ (A) Negative   CBC auto differential   Result Value Ref Range    WBC 14.83 (H) 3.90 - 12.70 K/uL    RBC 3.56 (L) 4.00 - 5.40 M/uL    Hemoglobin 10.6 (L) 12.0 - 16.0 g/dL    Hematocrit 32.0 (L) 37.0 - 48.5 %    MCV 90 82 - 98 fL    MCH 29.8 27.0 - 31.0 pg    MCHC 33.1 32.0 - 36.0 g/dL    RDW 13.2 11.5 - 14.5 %    Platelets 261 150 - 450 K/uL    MPV 12.0 9.2 - 12.9 fL    Immature Granulocytes 1.1 (H) 0.0 - 0.5 %    Gran # (ANC) 12.6 (H) 1.8 - 7.7 K/uL    Immature Grans (Abs) 0.16 (H) 0.00 - 0.04 K/uL    Lymph # 1.0 1.0 - 4.8 K/uL    Mono # 1.1 (H) 0.3 - 1.0 K/uL    Eos # 0.0 0.0 - 0.5 K/uL    Baso # 0.04 0.00 - 0.20 K/uL    nRBC 0 0 /100 WBC    Gran % 84.8 (H) 38.0 - 73.0 %    Lymph % 6.7 (L) 18.0 - 48.0 %    Mono % 7.1 4.0 - 15.0 %    Eosinophil % 0.0 0.0 - 8.0 %    Basophil % 0.3 0.0 - 1.9 %    Differential Method Automated    Comprehensive metabolic panel   Result Value Ref Range    Sodium 136 136 - 145 mmol/L    Potassium 3.3 (L) 3.5 - 5.1 mmol/L    Chloride 106 95 - 110 mmol/L    CO2 17 (L) 23 - 29 mmol/L    Glucose 68 (L) 70 - 110 mg/dL    BUN 5 (L) 6 - 20 mg/dL    Creatinine 0.7 0.5 - 1.4 mg/dL    Calcium 8.6 (L) 8.7 - 10.5 mg/dL    Total Protein 7.4 6.0 - 8.4 g/dL    Albumin 2.7 (L) 3.5 - 5.2 g/dL    Total Bilirubin 0.2 0.1 - 1.0 mg/dL    Alkaline Phosphatase 145 (H) 55 - 135 U/L    AST 13 10 - 40 U/L    ALT 6 (L) 10 - 44 U/L    Anion Gap 13 8 - 16 mmol/L    eGFR >60 >60 mL/min/1.73 m^2   Urinalysis Microscopic   Result Value Ref Range    RBC, UA 5 (H) 0 - 4 /hpf    WBC, UA >100 (H) 0 - 5 /hpf    WBC Clumps, UA Many (A) None-Rare    Bacteria Occasional None-Occ /hpf    Hyaline Casts, UA 0 0-1/lpf /lpf    Unclass Peri UA Occasional None-Moderate    Microscopic Comment SEE COMMENT      Imaging Results    None            The emergency physician reviewed the vital signs and test results, which are outlined above.     ED  Discussion         Patient's evaluation in the ED does not suggest any emergent or life-threatening medical conditions requiring immediate intervention beyond what was provided in the ED, and I believe patient is safe for discharge.  Regardless, an unremarkable evaluation in the ED does not preclude the development or presence of a serious or life-threatening condition. As such, patient was given return instructions for any change or worsening of symptoms.             ED Medication(s):  Medications   LIDOcaine HCL 20 mg/ml (2%) injection 5 mL (5 mLs Infiltration Given 3/21/23 0330)     Discharge Medication List as of 3/21/2023  3:36 AM        Prescription Management: I performed a review of the patient's current Rx medication list as input by nursing staff.     Follow-up Information       Schedule an appointment as soon as possible for a visit  with with your OBGyn.    Why: As needed             [facility] - Emergency Dept..    Specialty: Emergency Medicine  Why: As needed, If symptoms worsen  Contact information:  [address] [place] [id number] [phone]                           Scribe Attestation:   Scribe #1: I performed the above scribed service and the documentation accurately describes the services I performed. I attest to the accuracy of the note.     Attending:   Physician Attestation Statement for Scribe #1: I, Nasim Plascencia MD, personally performed the services described in this documentation, as scribed by Alejandro Calix, in my presence, and it is both accurate and complete. As with other dictation methods such as dictation software, small errors or inconsistencies may be overlooked due to the goal of spending more face-to-face time with patients.      Clinical Impression       ICD-10-CM ICD-9-CM   1. Abscess of left Bartholin's gland  N75.1 616.3   2. Vaginal pain  R10.2 625.9      ED Disposition Condition    Discharge Stable               Nasim Plascencia,  MD  03/21/23 0848

## 2023-03-23 ENCOUNTER — HOSPITAL ENCOUNTER (EMERGENCY)
Facility: HOSPITAL | Age: 18
Discharge: HOME | End: 2023-03-23
Attending: EMERGENCY MEDICINE
Payer: MEDICAID

## 2023-03-23 VITALS
RESPIRATION RATE: 16 BRPM | OXYGEN SATURATION: 98 % | SYSTOLIC BLOOD PRESSURE: 96 MMHG | BODY MASS INDEX: 21.85 KG/M2 | TEMPERATURE: 99 F | WEIGHT: 131.31 LBS | DIASTOLIC BLOOD PRESSURE: 60 MMHG | HEART RATE: 109 BPM

## 2023-03-23 DIAGNOSIS — N90.89 LABIAL IRRITATION: Primary | ICD-10-CM

## 2023-03-23 PROCEDURE — 99284 EMERGENCY DEPT VISIT MOD MDM: CPT

## 2023-03-23 RX ORDER — LIDOCAINE HYDROCHLORIDE 10 MG/ML
10 INJECTION, SOLUTION EPIDURAL; INFILTRATION; INTRACAUDAL; PERINEURAL
Status: DISCONTINUED | OUTPATIENT
Start: 2023-03-23 | End: 2023-03-23

## 2023-03-23 RX ORDER — ACETAMINOPHEN 500 MG
500 TABLET ORAL EVERY 6 HOURS PRN
Qty: 30 TABLET | Refills: 0 | Status: SHIPPED | OUTPATIENT
Start: 2023-03-23

## 2023-03-23 RX ORDER — CEPHALEXIN 500 MG/1
500 CAPSULE ORAL 2 TIMES DAILY
Qty: 10 CAPSULE | Refills: 0 | Status: SHIPPED | OUTPATIENT
Start: 2023-03-23 | End: 2023-03-28

## 2023-03-23 NOTE — ED PROVIDER NOTES
HISTORY     Chief Complaint   Patient presents with    Abscess     Bartholin cyst, drained 2 days ago. States pain is worse today. Pt is 35 weeks pregnant.     Review of patient's allergies indicates:  No Known Allergies     HPI   17-year-old female presents the emergency department for evaluation of left labial pain and swelling.  Patient reports she was seen several days ago for a Bartholin's abscess in which she had the area drained.  Patient reports the area has worsened over the last several days and she is coming back in for evaluation.  Patient is also currently 35 weeks pregnant.  Patient denies any fever, chills, chest pain, shortness of breath, back pain, abdominal pain, nausea, vomiting, vaginal bleeding, vaginal discharge, dysuria, and all other concerns at this time.    The history is provided by the patient. No  was used.      PCP: Primary Doctor No     Past Medical History:  No past medical history on file.     Past Surgical History:  No past surgical history on file.     Family History:  Family History   Problem Relation Age of Onset    Hypertension Mother     Diabetes Mother     Asthma Mother     Migraines Sister     Asthma Sister     Asthma Brother         Social History:  Social History     Tobacco Use    Smoking status: Never     Passive exposure: Never    Smokeless tobacco: Never   Substance and Sexual Activity    Alcohol use: No    Drug use: No    Sexual activity: Never         ROS   Review of Systems   Constitutional:  Negative for fever.   HENT:  Negative for sore throat.    Respiratory:  Negative for shortness of breath.    Cardiovascular:  Negative for chest pain.   Gastrointestinal:  Negative for abdominal pain, nausea and vomiting.   Genitourinary:  Positive for vaginal pain. Negative for dysuria, vaginal bleeding and vaginal discharge.   Musculoskeletal:  Negative for back pain.   Skin:  Negative for rash.   Neurological:  Negative for weakness.   Hematological:   Does not bruise/bleed easily.     PHYSICAL EXAM     Initial Vitals [03/23/23 1141]   BP Pulse Resp Temp SpO2   96/60 109 16 99 °F (37.2 °C) 98 %      MAP       --           Physical Exam    Nursing note and vitals reviewed.  Constitutional: She appears well-developed and well-nourished. She is not diaphoretic. No distress.   HENT:   Head: Normocephalic and atraumatic.   Eyes: Right eye exhibits no discharge. Left eye exhibits no discharge.   Neck: Neck supple.   Normal range of motion.  Cardiovascular:  Normal rate.           Pulmonary/Chest: No respiratory distress.   Abdominal: Abdomen is soft. There is no abdominal tenderness.   Genitourinary:    Genitourinary Comments: Female chaperone present for examination.  There is erythema and swelling noted to the left labia.  There is no fluctuance.  There is no vaginal discharge.  There is no vaginal bleeding.     Musculoskeletal:         General: Normal range of motion.      Cervical back: Normal range of motion and neck supple.     Neurological: She is alert and oriented to person, place, and time. She has normal strength.   Skin: Skin is warm and dry.   Psychiatric: She has a normal mood and affect. Her behavior is normal. Thought content normal.        ED COURSE   Procedures  ED ONGOING VITALS:  Vitals:    03/23/23 1141   BP: 96/60   Pulse: 109   Resp: 16   Temp: 99 °F (37.2 °C)   TempSrc: Oral   SpO2: 98%   Weight: 59.5 kg (131 lb 4.5 oz)         ABNORMAL LAB VALUES:  Labs Reviewed - No data to display      ALL LAB VALUES:  none      RADIOLOGY STUDIES:  Imaging Results    None                   The above vital signs and test results have been reviewed by the emergency provider.     ED Medications:  Current Discharge Medication List        START taking these medications    Details   acetaminophen (TYLENOL) 500 MG tablet Take 1 tablet (500 mg total) by mouth every 6 (six) hours as needed for Pain.  Qty: 30 tablet, Refills: 0      cephALEXin (KEFLEX) 500 MG capsule  Take 1 capsule (500 mg total) by mouth 2 (two) times a day. for 5 days  Qty: 10 capsule, Refills: 0           Discharge Medications:  New Prescriptions    ACETAMINOPHEN (TYLENOL) 500 MG TABLET    Take 1 tablet (500 mg total) by mouth every 6 (six) hours as needed for Pain.    CEPHALEXIN (KEFLEX) 500 MG CAPSULE    Take 1 capsule (500 mg total) by mouth 2 (two) times a day. for 5 days       Follow-up Information       [facility] In 2 days.    Specialty: Obstetrics and Gynecology  Contact information:  [address] [facility] [address] [phone]  Additional information:  Please take [address] for the Medical Office Building. Check in on the 3rd floor, to the right.             [facility] - Emergency Dept..    Specialty: Emergency Medicine  Why: If symptoms worsen  Contact information:  [address] [facility] [address] [phone]                          I discussed with patient and/or family/caretaker that evaluation in the ED does not suggest any emergent or life threatening medical conditions requiring immediate intervention beyond what was provided in the ED, and I believe patient is safe for discharge. Regardless, an unremarkable evaluation in the ED does not preclude the development or presence of a serious or life threatening condition. As such, patient was instructed to return immediately for any worsening or change in current symptoms.      MEDICAL DECISION MAKING   Medical Decision Making:   Initial Assessment:     Patient presents for left labial pain  Differential Diagnosis:    Bartholin's gland abscess   Genital sore  ED Management:   Patient will be placed on Keflex.  Patient to follow-up with OBGYN and return to the ER for any worsening or concerns .             CLINICAL IMPRESSION       ICD-10-CM ICD-9-CM   1. Labial irritation  N90.89 624.8       Disposition:   Disposition: Discharged  Condition: Stable       Bennett Weber  NP  03/23/23 1303

## 2023-03-24 ENCOUNTER — PATIENT MESSAGE (OUTPATIENT)
Dept: OTHER | Facility: OTHER | Age: 18
End: 2023-03-24
Payer: MEDICAID

## 2023-04-11 ENCOUNTER — PATIENT MESSAGE (OUTPATIENT)
Dept: OBSTETRICS AND GYNECOLOGY | Facility: CLINIC | Age: 18
End: 2023-04-11
Payer: MEDICAID

## 2023-04-13 ENCOUNTER — TELEPHONE (OUTPATIENT)
Dept: OBSTETRICS AND GYNECOLOGY | Facility: CLINIC | Age: 18
End: 2023-04-13
Payer: MEDICAID

## 2023-04-13 ENCOUNTER — HOSPITAL ENCOUNTER (OUTPATIENT)
Facility: HOSPITAL | Age: 18
Discharge: HOME | End: 2023-04-13
Attending: OBSTETRICS & GYNECOLOGY | Admitting: OBSTETRICS & GYNECOLOGY
Payer: MEDICAID

## 2023-04-13 VITALS
SYSTOLIC BLOOD PRESSURE: 107 MMHG | HEART RATE: 64 BPM | TEMPERATURE: 98 F | WEIGHT: 130.94 LBS | RESPIRATION RATE: 16 BRPM | DIASTOLIC BLOOD PRESSURE: 56 MMHG | OXYGEN SATURATION: 98 % | HEIGHT: 66 IN | BODY MASS INDEX: 21.04 KG/M2

## 2023-04-13 DIAGNOSIS — O47.9 THREATENED LABOR AT TERM: ICD-10-CM

## 2023-04-13 PROCEDURE — 59025 OBTAIN FETAL NONSTRESS TEST (NST): ICD-10-PCS | Mod: 26,,,

## 2023-04-13 PROCEDURE — 59025 FETAL NON-STRESS TEST: CPT

## 2023-04-13 PROCEDURE — 99211 OFF/OP EST MAY X REQ PHY/QHP: CPT | Mod: 25

## 2023-04-13 PROCEDURE — 99213 OFFICE O/P EST LOW 20 MIN: CPT | Mod: TH,25,,

## 2023-04-13 PROCEDURE — 99213 PR OFFICE/OUTPT VISIT, EST, LEVL III, 20-29 MIN: ICD-10-PCS | Mod: TH,25,,

## 2023-04-13 PROCEDURE — 59025 FETAL NON-STRESS TEST: CPT | Mod: 26,,,

## 2023-04-13 RX ORDER — SODIUM CHLORIDE, SODIUM LACTATE, POTASSIUM CHLORIDE, CALCIUM CHLORIDE 600; 310; 30; 20 MG/100ML; MG/100ML; MG/100ML; MG/100ML
INJECTION, SOLUTION INTRAVENOUS CONTINUOUS
Status: DISCONTINUED | OUTPATIENT
Start: 2023-04-13 | End: 2023-04-13 | Stop reason: HOSPADM

## 2023-04-13 RX ORDER — ACETAMINOPHEN 500 MG
500 TABLET ORAL EVERY 6 HOURS PRN
Status: DISCONTINUED | OUTPATIENT
Start: 2023-04-13 | End: 2023-04-13 | Stop reason: HOSPADM

## 2023-04-13 RX ORDER — ONDANSETRON 8 MG/1
8 TABLET, ORALLY DISINTEGRATING ORAL EVERY 8 HOURS PRN
Status: DISCONTINUED | OUTPATIENT
Start: 2023-04-13 | End: 2023-04-13 | Stop reason: HOSPADM

## 2023-04-13 RX ORDER — PROCHLORPERAZINE EDISYLATE 5 MG/ML
5 INJECTION INTRAMUSCULAR; INTRAVENOUS EVERY 6 HOURS PRN
Status: DISCONTINUED | OUTPATIENT
Start: 2023-04-13 | End: 2023-04-13 | Stop reason: HOSPADM

## 2023-04-13 NOTE — DISCHARGE INSTRUCTIONS

## 2023-04-13 NOTE — DISCHARGE SUMMARY
[facility] - Labor & Delivery  Obstetrics  Discharge Summary      Patient Name: Carina Buchanan  MRN: [id number]  Admission Date: 2023  Hospital Length of Stay: 0 days  Discharge Date and Time:  2023 5:48 AM  Attending Physician: Lew Nunez MD   Discharging Provider: Lisa Scruggs CNM   Primary Care Provider: Primary Doctor No    HPI: 17 y.o.  37w6d with c/o contractions since 2 AM        FHT: 135Cat 1 (reassuring)  TOCO:  irregular    * No surgery found *     Hospital Course:   R/O labor  NST  VE -3, recheck in 2 hours  IV fluids    23 @ 0545:  Patient sleeping  Contractions have spaced out  No change in cervix  NST reactive  Discharge instructions reviewed. Return to LD if experiencing decreased fetal movement, leaking of fluid, contractions, and/or vaginal bleeding. Discussed need to attend OB appts.  Was unable to make appts due to transportation issues. Now has transportation and will be able to make appts. Will make one for tomorrow or Monday with US. Patient verbalized understanding    Keep next scheduled  appt              Final Active Diagnoses:    Diagnosis Date Noted POA    PRINCIPAL PROBLEM:  Threatened labor at term [O47.9] 2023 Yes      Problems Resolved During this Admission:        Significant Diagnostic Studies: Labs: All labs within the past 24 hours have been reviewed    Immunizations     None          This patient has no babies on file.  Pending Diagnostic Studies:     None          Discharged Condition: good    Disposition: Home or Self Care    Follow Up:    Patient Instructions:      Notify your health care provider if you experience any of the following:  temperature >100.4     Notify your health care provider if you experience any of the following:  persistent nausea and vomiting or diarrhea     Notify your health care provider if you experience any of the following:  severe uncontrolled pain     Notify your health care provider if you experience any of the  following:  difficulty breathing or increased cough     Notify your health care provider if you experience any of the following:  severe persistent headache     Notify your health care provider if you experience any of the following:  persistent dizziness, light-headedness, or visual disturbances     Notify your health care provider if you experience any of the following:  increased confusion or weakness     Notify your health care provider if you experience any of the following:   Order Comments: Return to LD if experiencing decreased fetal movement, leaking of fluid, contractions, and/or vaginal bleeding     Activity as tolerated     Medications:  Current Discharge Medication List      CONTINUE these medications which have NOT CHANGED    Details   acetaminophen (TYLENOL) 500 MG tablet Take 1 tablet (500 mg total) by mouth every 6 (six) hours as needed for Pain.  Qty: 30 tablet, Refills: 0      ferrous sulfate 324 mg (65 mg iron) TbEC Take 1 tablet (324 mg total) by mouth once daily.  Qty: 30 tablet, Refills: 5    Associated Diagnoses: Anemia during pregnancy in first trimester      prenatal vit103-iron fum-folic () 27 mg iron- 1 mg Tab Take 1 tablet by mouth once daily.  Qty: 30 tablet, Refills: 11    Associated Diagnoses: Encounter for supervision of other normal pregnancy, first trimester             Lisa Scruggs CNM  Obstetrics  [facility] - Labor & Delivery

## 2023-04-13 NOTE — HOSPITAL COURSE
R/O labor  NST  VE 1/60/-3, recheck in 2 hours  IV fluids    4/13/23 @ 0545:  Patient sleeping  Contractions have spaced out  No change in cervix  NST reactive  Discharge instructions reviewed. Return to LD if experiencing decreased fetal movement, leaking of fluid, contractions, and/or vaginal bleeding. Discussed need to attend OB appts.  Was unable to make appts due to transportation issues. Now has transportation and will be able to make appts. Will make one for tomorrow or Monday with US. Patient verbalized understanding    Keep next scheduled  appt

## 2023-04-13 NOTE — H&P
[facility] - Labor & Delivery  Obstetrics  History & Physical    Patient Name: Carina Buchanan  MRN: [id number]  Admission Date: 2023  Primary Care Provider: Primary Doctor No    Subjective:     Principal Problem:Threatened labor at term    History of Present Illness:  17 y.o.  37w6d with c/o contractions since 2 AM        Obstetric HPI:  Patient reports frequent contractions, active fetal movement, No vaginal bleeding , No loss of fluid     This pregnancy has been complicated by LPNC (transportation issues), chlamydia, gonorrhea, FGR (EFW 12%, AC 2%)    OB History    Para Term  AB Living   2 1 1 0 0 1   SAB IAB Ectopic Multiple Live Births   0 0 0 0 1      # Outcome Date GA Lbr Ti/2nd Weight Sex Delivery Anes PTL Lv   2 Current            1 Term 20 38w0d   M Vag-Spont EPI  ANGELA      Name: Justen     History reviewed. No pertinent past medical history.  History reviewed. No pertinent surgical history.    Facility-Administered Medications Prior to Admission   Medication    cefTRIAXone injection 500 mg     PTA Medications   Medication Sig    acetaminophen (TYLENOL) 500 MG tablet Take 1 tablet (500 mg total) by mouth every 6 (six) hours as needed for Pain.    ferrous sulfate 324 mg (65 mg iron) TbEC Take 1 tablet (324 mg total) by mouth once daily.    prenatal vit103-iron fum-folic () 27 mg iron- 1 mg Tab Take 1 tablet by mouth once daily.       Review of patient's allergies indicates:  No Known Allergies     Family History       Problem Relation (Age of Onset)    Asthma Mother, Sister, Brother    Diabetes Mother    Hypertension Mother    Migraines Sister          Tobacco Use    Smoking status: Never     Passive exposure: Never    Smokeless tobacco: Never   Substance and Sexual Activity    Alcohol use: No    Drug use: No    Sexual activity: Never     Review of Systems   Gastrointestinal:  Positive for abdominal pain.   Genitourinary:  Negative for vaginal bleeding and vaginal  discharge.   All other systems reviewed and are negative.   Objective:     Vital Signs (Most Recent):  Temp: 98.1 °F (36.7 °C) (23 0400)  Pulse: 64 (23 0500)  Resp: 16 (23 0400)  BP: (!) 107/56 (23 0330)  SpO2: 98 % (23 0500)   Vital Signs (24h Range):  Temp:  [98.1 °F (36.7 °C)] 98.1 °F (36.7 °C)  Pulse:  [64-75] 64  Resp:  [16] 16  SpO2:  [97 %-98 %] 98 %  BP: (107)/(56) 107/56     Weight: 59.4 kg (130 lb 15.3 oz)  Body mass index is 21.14 kg/m².    FHT: 135Cat 1 (reassuring)  TOCO:  Q 2-6 minutes    Physical Exam:   Constitutional: She is oriented to person, place, and time. She appears well-developed and well-nourished.       Cardiovascular:  Normal rate.             Pulmonary/Chest: Effort normal. No respiratory distress.        Abdominal: Soft.     Genitourinary:    Vagina and uterus normal.             Musculoskeletal: Moves all extremeties.       Neurological: She is alert and oriented to person, place, and time.    Skin: Skin is warm and dry.    Psychiatric: She has a normal mood and affect.     Cervix: per RN  Dilation:  1  Effacement:  60  Station: -3  Presentation: Vertex     Significant Labs:  Lab Results   Component Value Date    GROUPTRH A POS 10/13/2022    HEPBSAG Non-reactive 10/13/2022       I have personallly reviewed all pertinent lab results from the last 24 hours.    Assessment/Plan:     17 y.o. female  at 37w6d for:    * Threatened labor at term  R/O labor  NST  VE /-3, recheck in 2 hours  IV fluids        Lisa Scruggs CNM  Obstetrics  [facility] - [facility] Delivery

## 2023-04-13 NOTE — PROCEDURES
"Carina Buchanan is a 17 y.o. female patient.    Temp: 98.1 °F (36.7 °C) (04/13/23 0400)  Pulse: 64 (04/13/23 0500)  Resp: 16 (04/13/23 0400)  BP: (!) 107/56 (04/13/23 0330)  SpO2: 98 % (04/13/23 0500)  Weight: 59.4 kg (130 lb 15.3 oz) (04/13/23 0400)  Height: 5' 6" (167.6 cm) (04/13/23 0400)       Obtain Fetal nonstress test (NST)    Date/Time: 4/13/2023 5:44 AM  Performed by: Lisa Scruggs CNM  Authorized by: Lisa Scruggs CNM     Nonstress Test:     Variability:  6-25 BPM    Decelerations:  None    Accelerations:  15 bpm    Acoustic Stimulator: No      Baseline:  135    Uterine Irritability: No      Contractions:  Irregular  Biophysical Profile:     Nonstress Test Interpretation: reactive      Overall Impression:  Reassuring  Post-procedure:     Patient tolerance:  Patient tolerated the procedure well with no immediate complications    4/13/2023    "

## 2023-04-13 NOTE — SUBJECTIVE & OBJECTIVE
Obstetric HPI:  Patient reports frequent contractions, active fetal movement, No vaginal bleeding , No loss of fluid     This pregnancy has been complicated by LPNC (transportation issues), chlamydia, gonorrhea, FGR (EFW 12%, AC 2%)    OB History    Para Term  AB Living   2 1 1 0 0 1   SAB IAB Ectopic Multiple Live Births   0 0 0 0 1      # Outcome Date GA Lbr Ti/2nd Weight Sex Delivery Anes PTL Lv   2 Current            1 Term 20 38w0d   M Vag-Spont EPI  ANGELA      Name: Justen     History reviewed. No pertinent past medical history.  History reviewed. No pertinent surgical history.    Facility-Administered Medications Prior to Admission   Medication    cefTRIAXone injection 500 mg     PTA Medications   Medication Sig    acetaminophen (TYLENOL) 500 MG tablet Take 1 tablet (500 mg total) by mouth every 6 (six) hours as needed for Pain.    ferrous sulfate 324 mg (65 mg iron) TbEC Take 1 tablet (324 mg total) by mouth once daily.    prenatal vit103-iron fum-folic () 27 mg iron- 1 mg Tab Take 1 tablet by mouth once daily.       Review of patient's allergies indicates:  No Known Allergies     Family History       Problem Relation (Age of Onset)    Asthma Mother, Sister, Brother    Diabetes Mother    Hypertension Mother    Migraines Sister          Tobacco Use    Smoking status: Never     Passive exposure: Never    Smokeless tobacco: Never   Substance and Sexual Activity    Alcohol use: No    Drug use: No    Sexual activity: Never     Review of Systems   Gastrointestinal:  Positive for abdominal pain.   Genitourinary:  Negative for vaginal bleeding and vaginal discharge.   All other systems reviewed and are negative.   Objective:     Vital Signs (Most Recent):  Temp: 98.1 °F (36.7 °C) (23 0400)  Pulse: 64 (23 0500)  Resp: 16 (23 0400)  BP: (!) 107/56 (23 0330)  SpO2: 98 % (23 0500)   Vital Signs (24h Range):  Temp:  [98.1 °F (36.7 °C)] 98.1 °F (36.7 °C)  Pulse:   [64-75] 64  Resp:  [16] 16  SpO2:  [97 %-98 %] 98 %  BP: (107)/(56) 107/56     Weight: 59.4 kg (130 lb 15.3 oz)  Body mass index is 21.14 kg/m².    FHT: 135Cat 1 (reassuring)  TOCO:  Q 2-6 minutes    Physical Exam:   Constitutional: She is oriented to person, place, and time. She appears well-developed and well-nourished.       Cardiovascular:  Normal rate.             Pulmonary/Chest: Effort normal. No respiratory distress.        Abdominal: Soft.     Genitourinary:    Vagina and uterus normal.             Musculoskeletal: Moves all extremeties.       Neurological: She is alert and oriented to person, place, and time.    Skin: Skin is warm and dry.    Psychiatric: She has a normal mood and affect.     Cervix: per RN  Dilation:  1  Effacement:  60  Station: -3  Presentation: Vertex     Significant Labs:  Lab Results   Component Value Date    GROUPTRH A POS 10/13/2022    HEPBSAG Non-reactive 10/13/2022       I have personallly reviewed all pertinent lab results from the last 24 hours.

## 2023-04-13 NOTE — TELEPHONE ENCOUNTER
----- Message from Lisa Scruggs CNM sent at 4/13/2023  5:34 AM CDT -----  Regarding: appt/US  Can some one please schedule this patient an appt with US at the [facility] location for tomorrow or Monday.     Thanks  Lisa

## 2023-06-05 PROBLEM — O23.03 PYELONEPHRITIS AFFECTING PREGNANCY IN THIRD TRIMESTER: Status: RESOLVED | Noted: 2023-03-04 | Resolved: 2023-06-05

## 2023-06-05 PROBLEM — O36.5930 POOR FETAL GROWTH AFFECTING MANAGEMENT OF MOTHER IN THIRD TRIMESTER: Status: RESOLVED | Noted: 2023-02-07 | Resolved: 2023-06-05

## 2023-11-30 ENCOUNTER — HOSPITAL ENCOUNTER (EMERGENCY)
Facility: HOSPITAL | Age: 18
Discharge: HOME | End: 2023-11-30
Attending: EMERGENCY MEDICINE
Payer: MEDICAID

## 2023-11-30 VITALS
HEART RATE: 73 BPM | BODY MASS INDEX: 17.19 KG/M2 | SYSTOLIC BLOOD PRESSURE: 101 MMHG | TEMPERATURE: 98 F | OXYGEN SATURATION: 99 % | DIASTOLIC BLOOD PRESSURE: 58 MMHG | WEIGHT: 106.5 LBS | RESPIRATION RATE: 16 BRPM

## 2023-11-30 DIAGNOSIS — N92.1 BREAKTHROUGH BLEEDING WITH IUD: Primary | ICD-10-CM

## 2023-11-30 DIAGNOSIS — Z97.5 BREAKTHROUGH BLEEDING WITH IUD: Primary | ICD-10-CM

## 2023-11-30 PROCEDURE — 99281 EMR DPT VST MAYX REQ PHY/QHP: CPT

## 2023-11-30 NOTE — ED PROVIDER NOTES
Encounter Date: 11/30/2023       History     Chief Complaint   Patient presents with    General Illness     Pt complaining of cramping, spotting, and migraines since her IUD was placed in April     18-year-old female presents emergency department with complaints of abdominal cramping intermittent vaginal spotting and headache since having her IUD placed in April.  Patient denies any fever, chills, severe abdominal pain, nausea/vomiting or any other symptoms.    The history is provided by the patient.     Review of patient's allergies indicates:  No Known Allergies  No past medical history on file.  No past surgical history on file.  Family History   Problem Relation Age of Onset    Hypertension Mother     Diabetes Mother     Asthma Mother     Migraines Sister     Asthma Sister     Asthma Brother      Social History     Tobacco Use    Smoking status: Never     Passive exposure: Never    Smokeless tobacco: Never   Substance Use Topics    Alcohol use: No    Drug use: No     Review of Systems   Constitutional:  Negative for fever.   HENT:  Negative for sore throat.    Respiratory:  Negative for shortness of breath.    Cardiovascular:  Negative for chest pain.   Gastrointestinal:  Negative for nausea.        +abdominal cramping   Genitourinary:  Positive for vaginal bleeding. Negative for dysuria.   Musculoskeletal:  Negative for back pain.   Skin:  Negative for rash.   Neurological:  Positive for headaches. Negative for weakness.   Hematological:  Does not bruise/bleed easily.   All other systems reviewed and are negative.      Physical Exam     Initial Vitals [11/30/23 1602]   BP Pulse Resp Temp SpO2   (!) 101/58 73 16 98.1 °F (36.7 °C) 99 %      MAP       --         Physical Exam    Constitutional: She appears well-developed and well-nourished. She is not diaphoretic. No distress.   HENT:   Head: Normocephalic and atraumatic.   Eyes: Conjunctivae and EOM are normal. Pupils are equal, round, and reactive to light.    Neck: Neck supple.   Normal range of motion.  Cardiovascular:  Normal rate, regular rhythm and normal heart sounds.           No murmur heard.  Pulmonary/Chest: Breath sounds normal. No respiratory distress. She has no wheezes. She has no rales.   Abdominal: Abdomen is soft. Bowel sounds are normal. There is no abdominal tenderness.   No abdominal tenderness or guarding There is no rebound and no guarding.   Musculoskeletal:         General: No tenderness or edema. Normal range of motion.      Cervical back: Normal range of motion and neck supple.     Neurological: She is alert and oriented to person, place, and time. No cranial nerve deficit. GCS score is 15. GCS eye subscore is 4. GCS verbal subscore is 5. GCS motor subscore is 6.   Skin: Skin is warm and dry. Capillary refill takes less than 2 seconds.   Psychiatric: She has a normal mood and affect. Thought content normal.         ED Course   Procedures  Labs Reviewed - No data to display       Imaging Results    None          Medications - No data to display  Medical Decision Making  Risk  Risk Details: Patient is in no acute distress, no tenderness on exam.  Symptoms have been intermittent for the last several months.  Advised patient to follow up with her OBGYN for possible IUD removal.  Patient should return to the emergency department for any worsening symptoms.                                      Clinical Impression:  Final diagnoses:  [N92.1, Z97.5] Breakthrough bleeding with IUD (Primary)          ED Disposition Condition    Discharge Stable          ED Prescriptions    None       Follow-up Information       Follow up With Specialties Details Why Contact Info Additional Information    [facility] - Emergency Dept. Emergency Medicine  If symptoms worsen [address] [facility] [address] [phone]     [facility] - OB GYN Obstetrics and Gynecology  Next available [address] [facility]  [address]  [phone] Please take [address] for the Medical Office Building. Check in on the 3rd floor, to the right.             Christian Rainey Jr., [facility]  11/30/23 9483

## 2024-07-03 ENCOUNTER — HOSPITAL ENCOUNTER (EMERGENCY)
Facility: HOSPITAL | Age: 19
Discharge: HOME | End: 2024-07-03
Attending: EMERGENCY MEDICINE
Payer: MEDICAID

## 2024-07-03 VITALS
SYSTOLIC BLOOD PRESSURE: 98 MMHG | DIASTOLIC BLOOD PRESSURE: 59 MMHG | TEMPERATURE: 99 F | OXYGEN SATURATION: 99 % | HEART RATE: 73 BPM | RESPIRATION RATE: 19 BRPM
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
## 2024-07-03 DIAGNOSIS — S00.83XA FACIAL CONTUSION, INITIAL ENCOUNTER: ICD-10-CM

## 2024-07-03 DIAGNOSIS — S00.83XA CONTUSION OF FACE, INITIAL ENCOUNTER: ICD-10-CM

## 2024-07-03 LAB
ALBUMIN SERPL BCP-MCNC: 4 G/DL (ref 3.5–5.2)
ALP SERPL-CCNC: 50 U/L (ref 55–135)
ALT SERPL W/O P-5'-P-CCNC: 16 U/L (ref 10–44)
ANION GAP SERPL CALC-SCNC: 10 MMOL/L (ref 8–16)
AST SERPL-CCNC: 30 U/L (ref 10–40)
BASOPHILS # BLD AUTO: 0.13 K/UL (ref 0–0.2)
BASOPHILS NFR BLD: 0.5 % (ref 0–1.9)
BILIRUB SERPL-MCNC: 0.5 MG/DL (ref 0.1–1)
BUN SERPL-MCNC: 8 MG/DL (ref 6–20)
CALCIUM SERPL-MCNC: 9 MG/DL (ref 8.7–10.5)
CHLORIDE SERPL-SCNC: 109 MMOL/L (ref 95–110)
CO2 SERPL-SCNC: 20 MMOL/L (ref 23–29)
CREAT SERPL-MCNC: 0.8 MG/DL (ref 0.5–1.4)
DIFFERENTIAL METHOD BLD: ABNORMAL
EOSINOPHIL # BLD AUTO: 0 K/UL (ref 0–0.5)
EOSINOPHIL NFR BLD: 0.1 % (ref 0–8)
ERYTHROCYTE [DISTWIDTH] IN BLOOD BY AUTOMATED COUNT: 13.2 % (ref 11.5–14.5)
EST. GFR  (NO RACE VARIABLE): >60 ML/MIN/1.73 M^2
GLUCOSE SERPL-MCNC: 107 MG/DL (ref 70–110)
HCG INTACT+B SERPL-ACNC: <1.2 MIU/ML
HCT VFR BLD AUTO: 39 % (ref 37–48.5)
HGB BLD-MCNC: 12.9 G/DL (ref 12–16)
IMM GRANULOCYTES # BLD AUTO: 0.12 K/UL (ref 0–0.04)
IMM GRANULOCYTES NFR BLD AUTO: 0.5 % (ref 0–0.5)
LYMPHOCYTES # BLD AUTO: 2.2 K/UL (ref 1–4.8)
LYMPHOCYTES NFR BLD: 8.6 % (ref 18–48)
MCH RBC QN AUTO: 29.7 PG (ref 27–31)
MCHC RBC AUTO-ENTMCNC: 33.1 G/DL (ref 32–36)
MCV RBC AUTO: 90 FL (ref 82–98)
MONOCYTES # BLD AUTO: 1.9 K/UL (ref 0.3–1)
MONOCYTES NFR BLD: 7.5 % (ref 4–15)
NEUTROPHILS # BLD AUTO: 21.1 K/UL (ref 1.8–7.7)
NEUTROPHILS NFR BLD: 82.8 % (ref 38–73)
NRBC BLD-RTO: 0 /100 WBC
PLATELET # BLD AUTO: 419 K/UL (ref 150–450)
PMV BLD AUTO: 10.7 FL (ref 9.2–12.9)
POTASSIUM SERPL-SCNC: 3 MMOL/L (ref 3.5–5.1)
PROT SERPL-MCNC: 7.5 G/DL (ref 6–8.4)
RBC # BLD AUTO: 4.34 M/UL (ref 4–5.4)
SODIUM SERPL-SCNC: 139 MMOL/L (ref 136–145)
WBC # BLD AUTO: 25.47 K/UL (ref 3.9–12.7)

## 2024-07-03 PROCEDURE — 84702 CHORIONIC GONADOTROPIN TEST: CPT | Performed by: PHYSICIAN ASSISTANT

## 2024-07-03 PROCEDURE — 85025 COMPLETE CBC W/AUTO DIFF WBC: CPT | Performed by: PHYSICIAN ASSISTANT

## 2024-07-03 PROCEDURE — 25500020 PHARM REV CODE 255: Performed by: PHYSICIAN ASSISTANT

## 2024-07-03 PROCEDURE — 80053 COMPREHEN METABOLIC PANEL: CPT | Performed by: PHYSICIAN ASSISTANT

## 2024-07-03 PROCEDURE — 25000003 PHARM REV CODE 250: Performed by: PHYSICIAN ASSISTANT

## 2024-07-03 PROCEDURE — 96360 HYDRATION IV INFUSION INIT: CPT | Mod: 59

## 2024-07-03 PROCEDURE — 99285 EMERGENCY DEPT VISIT HI MDM: CPT | Mod: 25

## 2024-07-03 RX ORDER — HYDROCODONE BITARTRATE AND ACETAMINOPHEN 5; 325 MG/1; MG/1
1 TABLET ORAL EVERY 4 HOURS PRN
Qty: 11 TABLET | Refills: 0 | Status: SHIPPED | OUTPATIENT
Start: 2024-07-03 | End: 2024-07-08

## 2024-07-03 RX ORDER — ONDANSETRON 4 MG/1
4 TABLET, ORALLY DISINTEGRATING ORAL EVERY 6 HOURS PRN
Qty: 15 TABLET | Refills: 0 | Status: SHIPPED | OUTPATIENT
Start: 2024-07-03

## 2024-07-03 RX ADMIN — IOHEXOL 100 ML: 350 INJECTION, SOLUTION INTRAVENOUS at 05:07

## 2024-07-03 RX ADMIN — SODIUM CHLORIDE 1000 ML: 0.9 INJECTION, SOLUTION INTRAVENOUS at 04:07
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
## 2024-07-03 NOTE — ED NOTES
Pt already has urine specimen cup. Asked pt if she could provide urine specimen but stated she does not need to go right now. Pt informed to knock on [facility] door when sample is obtained. Will check back later.
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
## 2024-07-21 ENCOUNTER — HOSPITAL ENCOUNTER (EMERGENCY)
Facility: HOSPITAL | Age: 19
Discharge: HOME | End: 2024-07-21
Attending: EMERGENCY MEDICINE
Payer: MEDICAID

## 2024-07-21 VITALS
HEART RATE: 92 BPM | RESPIRATION RATE: 17 BRPM | TEMPERATURE: 99 F | OXYGEN SATURATION: 100 % | DIASTOLIC BLOOD PRESSURE: 54 MMHG | BODY MASS INDEX: 19.83 KG/M2 | WEIGHT: 119.06 LBS | HEIGHT: 65 IN | SYSTOLIC BLOOD PRESSURE: 111 MMHG

## 2024-07-21 DIAGNOSIS — N12 PYELONEPHRITIS: Primary | ICD-10-CM

## 2024-07-21 LAB
ALBUMIN SERPL BCP-MCNC: 2.9 G/DL (ref 3.5–5.2)
ALP SERPL-CCNC: 63 U/L (ref 55–135)
ALT SERPL W/O P-5'-P-CCNC: 11 U/L (ref 10–44)
ANION GAP SERPL CALC-SCNC: 14 MMOL/L (ref 8–16)
AST SERPL-CCNC: 13 U/L (ref 10–40)
B-HCG UR QL: NEGATIVE
BACTERIA #/AREA URNS HPF: ABNORMAL /HPF
BASOPHILS # BLD AUTO: 0.08 K/UL (ref 0–0.2)
BASOPHILS NFR BLD: 0.3 % (ref 0–1.9)
BILIRUB SERPL-MCNC: 0.2 MG/DL (ref 0.1–1)
BILIRUB UR QL STRIP: NEGATIVE
BUN SERPL-MCNC: 12 MG/DL (ref 6–20)
CALCIUM SERPL-MCNC: 9.9 MG/DL (ref 8.7–10.5)
CHLORIDE SERPL-SCNC: 105 MMOL/L (ref 95–110)
CLARITY UR: ABNORMAL
CO2 SERPL-SCNC: 19 MMOL/L (ref 23–29)
COLOR UR: YELLOW
CREAT SERPL-MCNC: 1 MG/DL (ref 0.5–1.4)
DIFFERENTIAL METHOD BLD: ABNORMAL
EOSINOPHIL # BLD AUTO: 0.1 K/UL (ref 0–0.5)
EOSINOPHIL NFR BLD: 0.2 % (ref 0–8)
ERYTHROCYTE [DISTWIDTH] IN BLOOD BY AUTOMATED COUNT: 13.5 % (ref 11.5–14.5)
EST. GFR  (NO RACE VARIABLE): >60 ML/MIN/1.73 M^2
GLUCOSE SERPL-MCNC: 111 MG/DL (ref 70–110)
GLUCOSE UR QL STRIP: NEGATIVE
HCT VFR BLD AUTO: 35.6 % (ref 37–48.5)
HGB BLD-MCNC: 11.9 G/DL (ref 12–16)
HGB UR QL STRIP: ABNORMAL
HYALINE CASTS #/AREA URNS LPF: 0 /LPF
IMM GRANULOCYTES # BLD AUTO: 0.73 K/UL (ref 0–0.04)
IMM GRANULOCYTES NFR BLD AUTO: 2.6 % (ref 0–0.5)
KETONES UR QL STRIP: NEGATIVE
LACTATE SERPL-SCNC: 1.2 MMOL/L (ref 0.5–2.2)
LEUKOCYTE ESTERASE UR QL STRIP: ABNORMAL
LYMPHOCYTES # BLD AUTO: 0.7 K/UL (ref 1–4.8)
LYMPHOCYTES NFR BLD: 2.7 % (ref 18–48)
MCH RBC QN AUTO: 30.1 PG (ref 27–31)
MCHC RBC AUTO-ENTMCNC: 33.4 G/DL (ref 32–36)
MCV RBC AUTO: 90 FL (ref 82–98)
MICROSCOPIC COMMENT: ABNORMAL
MONOCYTES # BLD AUTO: 2.6 K/UL (ref 0.3–1)
MONOCYTES NFR BLD: 9.3 % (ref 4–15)
NEUTROPHILS # BLD AUTO: 23.4 K/UL (ref 1.8–7.7)
NEUTROPHILS NFR BLD: 84.9 % (ref 38–73)
NITRITE UR QL STRIP: NEGATIVE
NRBC BLD-RTO: 0 /100 WBC
PH UR STRIP: 6 [PH] (ref 5–8)
PLATELET # BLD AUTO: 245 K/UL (ref 150–450)
PMV BLD AUTO: 10.9 FL (ref 9.2–12.9)
POTASSIUM SERPL-SCNC: 3.4 MMOL/L (ref 3.5–5.1)
PROT SERPL-MCNC: 7.5 G/DL (ref 6–8.4)
PROT UR QL STRIP: ABNORMAL
RBC # BLD AUTO: 3.95 M/UL (ref 4–5.4)
RBC #/AREA URNS HPF: 8 /HPF (ref 0–4)
SODIUM SERPL-SCNC: 138 MMOL/L (ref 136–145)
SP GR UR STRIP: 1.01 (ref 1–1.03)
SQUAMOUS #/AREA URNS HPF: 5 /HPF
URN SPEC COLLECT METH UR: ABNORMAL
UROBILINOGEN UR STRIP-ACNC: NEGATIVE EU/DL
WBC # BLD AUTO: 27.62 K/UL (ref 3.9–12.7)
WBC #/AREA URNS HPF: 65 /HPF (ref 0–5)
WBC CLUMPS URNS QL MICRO: ABNORMAL

## 2024-07-21 PROCEDURE — 63600175 PHARM REV CODE 636 W HCPCS: Performed by: EMERGENCY MEDICINE

## 2024-07-21 PROCEDURE — 87186 SC STD MICRODIL/AGAR DIL: CPT | Performed by: EMERGENCY MEDICINE

## 2024-07-21 PROCEDURE — 96361 HYDRATE IV INFUSION ADD-ON: CPT

## 2024-07-21 PROCEDURE — 96365 THER/PROPH/DIAG IV INF INIT: CPT

## 2024-07-21 PROCEDURE — 25000003 PHARM REV CODE 250: Performed by: EMERGENCY MEDICINE

## 2024-07-21 PROCEDURE — 80053 COMPREHEN METABOLIC PANEL: CPT | Performed by: EMERGENCY MEDICINE

## 2024-07-21 PROCEDURE — 85025 COMPLETE CBC W/AUTO DIFF WBC: CPT | Performed by: EMERGENCY MEDICINE

## 2024-07-21 PROCEDURE — 87040 BLOOD CULTURE FOR BACTERIA: CPT | Performed by: EMERGENCY MEDICINE

## 2024-07-21 PROCEDURE — 87088 URINE BACTERIA CULTURE: CPT | Performed by: EMERGENCY MEDICINE

## 2024-07-21 PROCEDURE — 83605 ASSAY OF LACTIC ACID: CPT | Performed by: EMERGENCY MEDICINE

## 2024-07-21 PROCEDURE — 96375 TX/PRO/DX INJ NEW DRUG ADDON: CPT

## 2024-07-21 PROCEDURE — 25500020 PHARM REV CODE 255: Performed by: EMERGENCY MEDICINE

## 2024-07-21 PROCEDURE — 87086 URINE CULTURE/COLONY COUNT: CPT | Performed by: EMERGENCY MEDICINE

## 2024-07-21 PROCEDURE — 81000 URINALYSIS NONAUTO W/SCOPE: CPT | Performed by: EMERGENCY MEDICINE

## 2024-07-21 PROCEDURE — 81025 URINE PREGNANCY TEST: CPT | Performed by: EMERGENCY MEDICINE

## 2024-07-21 RX ORDER — ALUMINUM HYDROXIDE, MAGNESIUM HYDROXIDE, AND SIMETHICONE 1200; 120; 1200 MG/30ML; MG/30ML; MG/30ML
30 SUSPENSION ORAL ONCE
Status: COMPLETED | OUTPATIENT
Start: 2024-07-21 | End: 2024-07-21

## 2024-07-21 RX ORDER — MORPHINE SULFATE 4 MG/ML
4 INJECTION, SOLUTION INTRAMUSCULAR; INTRAVENOUS
Status: COMPLETED | OUTPATIENT
Start: 2024-07-21 | End: 2024-07-21

## 2024-07-21 RX ORDER — LEVOFLOXACIN 750 MG/1
750 TABLET ORAL
Status: COMPLETED | OUTPATIENT
Start: 2024-07-21 | End: 2024-07-21

## 2024-07-21 RX ORDER — METOCLOPRAMIDE HYDROCHLORIDE 5 MG/ML
10 INJECTION INTRAMUSCULAR; INTRAVENOUS
Status: DISCONTINUED | OUTPATIENT
Start: 2024-07-21 | End: 2024-07-21

## 2024-07-21 RX ORDER — HYDROCODONE BITARTRATE AND ACETAMINOPHEN 5; 325 MG/1; MG/1
1 TABLET ORAL EVERY 6 HOURS PRN
Qty: 12 TABLET | Refills: 0 | Status: SHIPPED | OUTPATIENT
Start: 2024-07-21

## 2024-07-21 RX ORDER — NAPROXEN 500 MG/1
500 TABLET ORAL 2 TIMES DAILY WITH MEALS
Qty: 10 TABLET | Refills: 0 | Status: SHIPPED | OUTPATIENT
Start: 2024-07-21

## 2024-07-21 RX ORDER — ONDANSETRON 4 MG/1
4 TABLET, ORALLY DISINTEGRATING ORAL EVERY 8 HOURS PRN
Qty: 12 TABLET | Refills: 0 | Status: SHIPPED | OUTPATIENT
Start: 2024-07-21

## 2024-07-21 RX ORDER — LIDOCAINE HYDROCHLORIDE 20 MG/ML
15 SOLUTION OROPHARYNGEAL ONCE
Status: COMPLETED | OUTPATIENT
Start: 2024-07-21 | End: 2024-07-21

## 2024-07-21 RX ORDER — LEVOFLOXACIN 750 MG/1
750 TABLET ORAL DAILY
Qty: 7 TABLET | Refills: 0 | Status: SHIPPED | OUTPATIENT
Start: 2024-07-22

## 2024-07-21 RX ORDER — ONDANSETRON HYDROCHLORIDE 2 MG/ML
4 INJECTION, SOLUTION INTRAVENOUS
Status: COMPLETED | OUTPATIENT
Start: 2024-07-21 | End: 2024-07-21

## 2024-07-21 RX ADMIN — IOHEXOL 100 ML: 350 INJECTION, SOLUTION INTRAVENOUS at 06:07

## 2024-07-21 RX ADMIN — PIPERACILLIN SODIUM AND TAZOBACTAM SODIUM 4.5 G: 4; .5 INJECTION, POWDER, FOR SOLUTION INTRAVENOUS at 06:07

## 2024-07-21 RX ADMIN — SODIUM CHLORIDE 1000 ML: 9 INJECTION, SOLUTION INTRAVENOUS at 05:07

## 2024-07-21 RX ADMIN — SODIUM CHLORIDE 1620 ML: 9 INJECTION, SOLUTION INTRAVENOUS at 07:07

## 2024-07-21 RX ADMIN — MORPHINE SULFATE 4 MG: 4 INJECTION INTRAVENOUS at 06:07

## 2024-07-21 RX ADMIN — LIDOCAINE HYDROCHLORIDE 15 ML: 20 SOLUTION ORAL at 08:07

## 2024-07-21 RX ADMIN — LEVOFLOXACIN 750 MG: 750 TABLET, FILM COATED ORAL at 08:07

## 2024-07-21 RX ADMIN — ONDANSETRON 4 MG: 2 INJECTION INTRAMUSCULAR; INTRAVENOUS at 05:07

## 2024-07-21 RX ADMIN — ALUMINUM HYDROXIDE, MAGNESIUM HYDROXIDE, AND SIMETHICONE 30 ML: 1200; 120; 1200 SUSPENSION ORAL at 08:07

## 2024-07-21 NOTE — ED PROVIDER NOTES
SCRIBE #1 NOTE: I, Aurora Wilson, am scribing for, and in the presence of, Favian Jimenez DO. I have scribed the entire note.       History     Chief Complaint   Patient presents with    Abdominal Pain     Pt c/o pain to the RLQ and right flank.  She also c/o nausea but denies vomiting.  Pt denies urinary complaints.     Review of patient's allergies indicates:  No Known Allergies      History of Present Illness     HPI    7/21/2024, 5:01 PM  History obtained from the patient      History of Present Illness: Carina Buchanan is a 18 y.o. female patient who presents to the Emergency Department for evaluation of RLQ abdominal pain and R flank pain which onset 3-4 days ago. Symptoms are constant and moderate in severity. No mitigating or exacerbating factors reported. Associated sxs include fever (103°F), chills, and nausea. Patient denies any CP, SOB, vomiting, diarrhea, dysuria, weakness, and all other sxs at this time. Prior Tx includes Ibuprofen which did not relieve patient's symptoms. No known drug allergies reported. Pt reports that she has had bad kidney infections in the past. No further complaints or concerns at this time.       Arrival mode: Personal vehicle    PCP: No, Primary Doctor        Past Medical History:  No past medical history on file.    Past Surgical History:  No past surgical history on file.      Family History:  Family History   Problem Relation Name Age of Onset    Hypertension Mother      Diabetes Mother      Asthma Mother      Migraines Sister      Asthma Sister      Asthma Brother         Social History:  Social History     Tobacco Use    Smoking status: Never     Passive exposure: Never    Smokeless tobacco: Never   Substance and Sexual Activity    Alcohol use: No    Drug use: No    Sexual activity: Never        Review of Systems     Review of Systems   Constitutional:  Positive for chills and fever (103°F).   HENT:  Negative for sore throat.    Respiratory:  Negative for  shortness of breath.    Cardiovascular:  Negative for chest pain.   Gastrointestinal:  Positive for abdominal pain (RLQ) and nausea. Negative for diarrhea and vomiting.   Genitourinary:  Positive for flank pain (R). Negative for dysuria.   Musculoskeletal:  Negative for back pain.   Skin:  Negative for rash.   Neurological:  Negative for weakness.   Hematological:  Does not bruise/bleed easily.   All other systems reviewed and are negative.     Physical Exam     Initial Vitals [07/21/24 1619]   BP Pulse Resp Temp SpO2   108/60 (!) 116 16 98.4 °F (36.9 °C) 99 %      MAP       --          Physical Exam  Vitals reviewed.   Constitutional:       General: She is not in acute distress.     Appearance: Normal appearance.   Cardiovascular:      Comments: Tachycardic rate with regular rhythm murmurs noted  Abdominal:      Comments: Abdomen is soft, there is tenderness to palpation to the right lower quadrant no rigidity or distention   Musculoskeletal:         General: Normal range of motion.   Skin:     General: Skin is warm and dry.      Findings: No rash.   Neurological:      General: No focal deficit present.      Mental Status: She is alert and oriented to person, place, and time.      Sensory: No sensory deficit.            ED Course   Critical Care    Date/Time: 7/21/2024 7:30 PM    Performed by: Favian Jimenez DO  Authorized by: Favian Jimenez DO  Direct patient critical care time: 20 minutes  Additional history critical care time: 5 minutes  Ordering / reviewing critical care time: 5 minutes  Documentation critical care time: 5 minutes  Total critical care time (exclusive of procedural time) : 35 minutes  Critical care time was exclusive of separately billable procedures and treating other patients.  Critical care was necessary to treat or prevent imminent or life-threatening deterioration of the following conditions: sepsis.  Critical care was time spent personally by me on the following activities:  "development of treatment plan with patient or surrogate, interpretation of cardiac output measurements, evaluation of patient's response to treatment, examination of patient, obtaining history from patient or surrogate, ordering and performing treatments and interventions, ordering and review of laboratory studies, ordering and review of radiographic studies, re-evaluation of patient's condition and review of old charts.        ED Vital Signs:  Vitals:    07/21/24 1619 07/21/24 1749 07/21/24 1813 07/21/24 1915   BP: 108/60 115/67  109/65   Pulse: (!) 116 94  95   Resp: 16  18    Temp: 98.4 °F (36.9 °C)   98.5 °F (36.9 °C)   TempSrc: Oral   Oral   SpO2: 99% 99%  99%   Weight: 54 kg (119 lb 0.8 oz)      Height: 5' 5" (1.651 m)       07/21/24 2030   BP: (!) 111/54   Pulse: 92   Resp: 17   Temp:    TempSrc:    SpO2: 100%   Weight:    Height:        Abnormal Lab Results:  Labs Reviewed   CULTURE, URINE - Abnormal       Result Value    Urine Culture, Routine   (*)     Value: ESCHERICHIA COLI  50,000 - 99,999 cfu/ml      Narrative:     Specimen Source->Urine   CBC W/ AUTO DIFFERENTIAL - Abnormal    WBC 27.62 (*)     RBC 3.95 (*)     Hemoglobin 11.9 (*)     Hematocrit 35.6 (*)     MCV 90      MCH 30.1      MCHC 33.4      RDW 13.5      Platelets 245      MPV 10.9      Immature Granulocytes 2.6 (*)     Gran # (ANC) 23.4 (*)     Immature Grans (Abs) 0.73 (*)     Lymph # 0.7 (*)     Mono # 2.6 (*)     Eos # 0.1      Baso # 0.08      nRBC 0      Gran % 84.9 (*)     Lymph % 2.7 (*)     Mono % 9.3      Eosinophil % 0.2      Basophil % 0.3      Differential Method Automated     COMPREHENSIVE METABOLIC PANEL - Abnormal    Sodium 138      Potassium 3.4 (*)     Chloride 105      CO2 19 (*)     Glucose 111 (*)     BUN 12      Creatinine 1.0      Calcium 9.9      Total Protein 7.5      Albumin 2.9 (*)     Total Bilirubin 0.2      Alkaline Phosphatase 63      AST 13      ALT 11      eGFR >60      Anion Gap 14     URINALYSIS, REFLEX TO " URINE CULTURE - Abnormal    Specimen UA Urine, Clean Catch      Color, UA Yellow      Appearance, UA Hazy (*)     pH, UA 6.0      Specific Gravity, UA 1.010      Protein, UA 1+ (*)     Glucose, UA Negative      Ketones, UA Negative      Bilirubin (UA) Negative      Occult Blood UA 1+ (*)     Nitrite, UA Negative      Urobilinogen, UA Negative      Leukocytes, UA 3+ (*)     Narrative:     Specimen Source->Urine   URINALYSIS MICROSCOPIC - Abnormal    RBC, UA 8 (*)     WBC, UA 65 (*)     WBC Clumps, UA Few (*)     Bacteria Many (*)     Squam Epithel, UA 5      Hyaline Casts, UA 0      Microscopic Comment SEE COMMENT      Narrative:     Specimen Source->Urine   CULTURE, BLOOD    Blood Culture, Routine No Growth to date      Blood Culture, Routine No Growth to date      Blood Culture, Routine No Growth to date     CULTURE, BLOOD    Blood Culture, Routine No Growth to date      Blood Culture, Routine No Growth to date      Blood Culture, Routine No Growth to date     PREGNANCY TEST, URINE RAPID    Preg Test, Ur Negative      Narrative:     Specimen Source->Urine   LACTIC ACID, PLASMA    Lactate (Lactic Acid) 1.2          All Lab Results:  Results for orders placed or performed during the hospital encounter of 07/21/24   Blood culture #1 **CANNOT BE ORDERED STAT**    Specimen: Peripheral, Antecubital, Right; Blood   Result Value Ref Range    Blood Culture, Routine No Growth to date     Blood Culture, Routine No Growth to date     Blood Culture, Routine No Growth to date    Blood culture #2 **CANNOT BE ORDERED STAT**    Specimen: Peripheral, Upper Arm, Left; Blood   Result Value Ref Range    Blood Culture, Routine No Growth to date     Blood Culture, Routine No Growth to date     Blood Culture, Routine No Growth to date    Urine culture    Specimen: Urine   Result Value Ref Range    Urine Culture, Routine ESCHERICHIA COLI  50,000 - 99,999 cfu/ml   (A)        Susceptibility    Escherichia coli - CULTURE, URINE      Amp/Sulbactam <=8/4 Sensitive mcg/mL     Ampicillin <=8 Sensitive mcg/mL     Ceftriaxone <=1 Sensitive mcg/mL     Cefazolin <=2 Sensitive mcg/mL     Ciprofloxacin <=0.25 Sensitive mcg/mL     Cefepime <=2 Sensitive mcg/mL     Ertapenem <=0.5 Sensitive mcg/mL     Nitrofurantoin <=32 Sensitive mcg/mL     Gentamicin <=2 Sensitive mcg/mL     Levofloxacin <=0.5 Sensitive mcg/mL     Meropenem <=1 Sensitive mcg/mL     Piperacillin/Tazo <=8 Sensitive mcg/mL     Trimeth/Sulfa <=2/38 Sensitive mcg/mL     Tobramycin <=2 Sensitive mcg/mL   CBC W/ AUTO DIFFERENTIAL   Result Value Ref Range    WBC 27.62 (H) 3.90 - 12.70 K/uL    RBC 3.95 (L) 4.00 - 5.40 M/uL    Hemoglobin 11.9 (L) 12.0 - 16.0 g/dL    Hematocrit 35.6 (L) 37.0 - 48.5 %    MCV 90 82 - 98 fL    MCH 30.1 27.0 - 31.0 pg    MCHC 33.4 32.0 - 36.0 g/dL    RDW 13.5 11.5 - 14.5 %    Platelets 245 150 - 450 K/uL    MPV 10.9 9.2 - 12.9 fL    Immature Granulocytes 2.6 (H) 0.0 - 0.5 %    Gran # (ANC) 23.4 (H) 1.8 - 7.7 K/uL    Immature Grans (Abs) 0.73 (H) 0.00 - 0.04 K/uL    Lymph # 0.7 (L) 1.0 - 4.8 K/uL    Mono # 2.6 (H) 0.3 - 1.0 K/uL    Eos # 0.1 0.0 - 0.5 K/uL    Baso # 0.08 0.00 - 0.20 K/uL    nRBC 0 0 /100 WBC    Gran % 84.9 (H) 38.0 - 73.0 %    Lymph % 2.7 (L) 18.0 - 48.0 %    Mono % 9.3 4.0 - 15.0 %    Eosinophil % 0.2 0.0 - 8.0 %    Basophil % 0.3 0.0 - 1.9 %    Differential Method Automated    Comp. Metabolic Panel   Result Value Ref Range    Sodium 138 136 - 145 mmol/L    Potassium 3.4 (L) 3.5 - 5.1 mmol/L    Chloride 105 95 - 110 mmol/L    CO2 19 (L) 23 - 29 mmol/L    Glucose 111 (H) 70 - 110 mg/dL    BUN 12 6 - 20 mg/dL    Creatinine 1.0 0.5 - 1.4 mg/dL    Calcium 9.9 8.7 - 10.5 mg/dL    Total Protein 7.5 6.0 - 8.4 g/dL    Albumin 2.9 (L) 3.5 - 5.2 g/dL    Total Bilirubin 0.2 0.1 - 1.0 mg/dL    Alkaline Phosphatase 63 55 - 135 U/L    AST 13 10 - 40 U/L    ALT 11 10 - 44 U/L    eGFR >60 >60 mL/min/1.73 m^2    Anion Gap 14 8 - 16 mmol/L   Urinalysis, Reflex to Urine  Culture Urine, Clean Catch    Specimen: Urine   Result Value Ref Range    Specimen UA Urine, Clean Catch     Color, UA Yellow Yellow, Straw, Cee    Appearance, UA Hazy (A) Clear    pH, UA 6.0 5.0 - 8.0    Specific Gravity, UA 1.010 1.005 - 1.030    Protein, UA 1+ (A) Negative    Glucose, UA Negative Negative    Ketones, UA Negative Negative    Bilirubin (UA) Negative Negative    Occult Blood UA 1+ (A) Negative    Nitrite, UA Negative Negative    Urobilinogen, UA Negative <2.0 EU/dL    Leukocytes, UA 3+ (A) Negative   Pregnancy, urine rapid   Result Value Ref Range    Preg Test, Ur Negative    Lactic acid, plasma   Result Value Ref Range    Lactate (Lactic Acid) 1.2 0.5 - 2.2 mmol/L   Urinalysis Microscopic   Result Value Ref Range    RBC, UA 8 (H) 0 - 4 /hpf    WBC, UA 65 (H) 0 - 5 /hpf    WBC Clumps, UA Few (A) None-Rare    Bacteria Many (A) None-Occ /hpf    Squam Epithel, UA 5 /hpf    Hyaline Casts, UA 0 0-1/lpf /lpf    Microscopic Comment SEE COMMENT          Imaging Results:  Imaging Results               CT Abdomen Pelvis With IV Contrast NO Oral Contrast (Final result)  Result time 07/21/24 18:47:51      Final result by Emanuel Marin MD (07/21/24 18:47:51)                   Impression:      Right pyelonephritis with suspected developing renal abscesses as detailed above.    Complete findings as above.    This report was flagged in Epic as abnormal.    All CT scans at this facility are performed  using dose modulation techniques as appropriate to performed exam including the following:  automated exposure control; adjustment of mA and/or kV according to the patients size (this includes techniques or standardized protocols for targeted exams where dose is matched to indication/reason for exam: i.e. extremities or head);  iterative reconstruction technique.      Electronically signed by: Emanuel Marin  Date:    07/21/2024  Time:    18:47               Narrative:    EXAMINATION:  CT ABDOMEN PELVIS WITH IV  CONTRAST    CLINICAL HISTORY:  RLQ abdominal pain (Age >= 14y);significant leukocytosis and evidence of UTI, eval for appendicitis please;    TECHNIQUE:  Low dose axial images, sagittal and coronal reformations were obtained from the lung bases to the pubic symphysis.  Contrast was administered.  Images acquired after the administration 100 mL Omnipaque 350 IV contrast.    COMPARISON:  07/03/2024    FINDINGS:  Heart: Normal in size. No pericardial effusion.    Lung Bases: Well aerated, without consolidation or pleural fluid.    Liver: Normal in size and attenuation, with no focal hepatic lesions.    Gallbladder: No calcified gallstones.    Bile Ducts: No evidence of dilated ducts.    Pancreas: No mass or peripancreatic fat stranding.    Spleen: Unremarkable.    Adrenals: Unremarkable.    Kidneys/ Ureters: Right pyelonephritis with suspected developing renal abscesses with cystic lesions which appear heterogeneous not seen on the prior exam.  Left kidney appears better preserved.    Bladder: No evidence of wall thickening.    Reproductive organs: Question hypoenhancement of the myometrium possibly related to contrast timing.  Pelvic venous congestion could be considered noting prominent vascularity.  IUD in good position.    GI Tract/Mesentery: No evidence of bowel obstruction or inflammation.  No evidence of appendicitis.    Peritoneal Space: No ascites. No free air.    Retroperitoneum: No significant adenopathy.    Abdominal wall: Unremarkable.    Vasculature: No significant atherosclerosis or aneurysm.    Bones: No acute fracture.                                            The Emergency Provider reviewed the vital signs and test results, which are outlined above.     ED Discussion     7:21 PM: Pt is A&O x3, appropriate, and of capacity at this time. Pt voices desire to leave hospital. D/w pt in length need for further evaluation and treatment due to HPI and PEx. Pt declines any further evaluation or tx at this  time. All risks, including worsening sx, permanent bodily harm and death, were discussed in length. Pt acknowledges all risk at this time and agrees to sign AMA form. Pt given RTER instructions. All questions and concerns addressed at this time. Pt leaving AMA.       ED Course as of 07/24/24 0904   Sun Jul 21, 2024   1750 CBC W/ AUTO DIFFERENTIAL(!)  Leukocytosis noted [CD]   1805 Urinalysis, Reflex to Urine Culture Urine, Clean Catch(!)  Urinary tract infection [CD]   1805 Comp. Metabolic Panel(!)  Nonspecific findings [CD]   1901 Lactic acid, plasma  wnl [CD]   1913 CT Abdomen Pelvis With IV Contrast NO Oral Contrast(!)  Heart: Normal in size. No pericardial effusion.     Lung Bases: Well aerated, without consolidation or pleural fluid.     Liver: Normal in size and attenuation, with no focal hepatic lesions.     Gallbladder: No calcified gallstones.     Bile Ducts: No evidence of dilated ducts.     Pancreas: No mass or peripancreatic fat stranding.     Spleen: Unremarkable.     Adrenals: Unremarkable.     Kidneys/ Ureters: Right pyelonephritis with suspected developing renal abscesses with cystic lesions which appear heterogeneous not seen on the prior exam.  Left kidney appears better preserved.     Bladder: No evidence of wall thickening.     Reproductive organs: Question hypoenhancement of the myometrium possibly related to contrast timing.  Pelvic venous congestion could be considered noting prominent vascularity.  IUD in good position.     GI Tract/Mesentery: No evidence of bowel obstruction or inflammation.  No evidence of appendicitis.     Peritoneal Space: No ascites. No free air.     Retroperitoneum: No significant adenopathy.     Abdominal wall: Unremarkable.     Vasculature: No significant atherosclerosis or aneurysm.     Bones: No acute fracture.   [CD]      ED Course User Index  [CD] Favian Jimenez, DO     Medical Decision Making  I had a discussion with the patient at bedside.  My strong  recommendation was for admission for IV antibiotics.  I did explain to the patient that she had an infection in her urine that has progressed up to the level of her kidneys and there is concern she has an abscess on one of her kidneys.  She states that she can not stay because she does not have anyone to watch her kids.  I explained to her that I would give her time to make phone calls to see if she can find someone to watch them however she states that she has no one.  We discussed death and permanent disability.  We discussed the inability to complete activities of daily living and the inability of her to take care of her kids.  She verbalized understanding of all this and still states that she wishes to leave against medical advice.  She is tolerating fluids at the time of leaving.  We will treat her for pyelonephritis, return precautions given.  She is instructed to return immediately for any new or worsening symptoms or if at any time she has any 2nd thoughts about her decision to leave against medical advice and she verbalized understanding.    Amount and/or Complexity of Data Reviewed  Labs: ordered. Decision-making details documented in ED Course.     Details: Blood cultures were obtained and will be monitored  Radiology: ordered and independent interpretation performed. Decision-making details documented in ED Course.    Risk  OTC drugs.  Prescription drug management.  Risk Details: Differential diagnosis includes but is not limited to:  UTI, ureteral calculus, appendicitis, tubo-ovarian abscess, ovarian torsion, ectopic pregnancy, enteritis, colitis, pyelonephritis                ED Medication(s):  Medications   ondansetron injection 4 mg (4 mg Intravenous Given 7/21/24 1710)   sodium chloride 0.9% bolus 1,000 mL 1,000 mL (0 mLs Intravenous Stopped 7/21/24 1921)   sodium chloride 0.9% bolus 1,620 mL 1,620 mL (0 mLs Intravenous Stopped 7/21/24 2032)   piperacillin-tazobactam (ZOSYN) 4.5 g in D5W 100 mL IVPB  (MB+) (0 g Intravenous Stopped 7/21/24 1910)   morphine injection 4 mg (4 mg Intravenous Given 7/21/24 1813)   iohexoL (OMNIPAQUE 350) injection 100 mL (100 mLs Intravenous Given 7/21/24 1827)   levoFLOXacin tablet 750 mg (750 mg Oral Given 7/2003)   aluminum-magnesium hydroxide-simethicone 200-200-20 mg/5 mL suspension 30 mL (30 mLs Oral Given 7/21/24 2015)     And   LIDOcaine viscous HCl 2% oral solution 15 mL (15 mLs Oral Given 7/21/24 2015)       Discharge Medication List as of 7/21/2024  7:37 PM        START taking these medications    Details   HYDROcodone-acetaminophen (NORCO) 5-325 mg per tablet Take 1 tablet by mouth every 6 (six) hours as needed for Pain., Starting Sun 7/21/2024, Normal      levoFLOXacin (LEVAQUIN) 750 MG tablet Take 1 tablet (750 mg total) by mouth once daily., Starting Mon 7/22/2024, Normal      naproxen (NAPROSYN) 500 MG tablet Take 1 tablet (500 mg total) by mouth 2 (two) times daily with meals., Starting Sun 7/21/2024, Normal      !! ondansetron (ZOFRAN-ODT) 4 MG TbDL Take 1 tablet (4 mg total) by mouth every 8 (eight) hours as needed (nausea/vomiting)., Starting Sun 7/21/2024, Normal       !! - Potential duplicate medications found. Please discuss with provider.           Follow-up Information       Schedule an appointment as soon as possible for a visit  with [place].    Contact information:  [address] [place] [address] [phone]               Schedule an appointment as soon as possible for a visit  with Virgil Ornelas MD.    Specialty: Urology  Contact information:  [address]  [place] [address] [phone]                                 Scribe Attestation:   Scribe #1: I performed the above scribed service and the documentation accurately describes the services I performed. I attest to the accuracy of the note.     Attending:   Physician Attestation Statement for Scribe #1: I, Favian Jimenez, DO, personally  performed the services described in this documentation, as scribed by Aurora Wilson, in my presence, and it is both accurate and complete.           Clinical Impression       ICD-10-CM ICD-9-CM   1. Pyelonephritis  N12 590.80       Disposition:   Disposition: Favian Nelson,   07/24/24 0906

## 2024-07-22 NOTE — DISCHARGE INSTRUCTIONS
1.)  You have a very serious urinary tract infection that has spread to your kidneys and is possibly causing you to form an abscess on your right kidney.  You are leaving against medical advice.  Return immediately at any time for any new or worsening symptoms or if at any time you have any second thoughts about your decision to leave against medical advice.

## 2024-07-23 LAB — BACTERIA UR CULT: ABNORMAL

## 2024-07-27 LAB
BACTERIA BLD CULT: NORMAL
BACTERIA BLD CULT: NORMAL

## 2024-12-11 ENCOUNTER — HOSPITAL ENCOUNTER (EMERGENCY)
Facility: HOSPITAL | Age: 19
Discharge: HOME | End: 2024-12-11
Attending: EMERGENCY MEDICINE
Payer: MEDICAID

## 2024-12-11 VITALS
HEART RATE: 88 BPM | DIASTOLIC BLOOD PRESSURE: 72 MMHG | SYSTOLIC BLOOD PRESSURE: 110 MMHG | RESPIRATION RATE: 18 BRPM | BODY MASS INDEX: 21.46 KG/M2 | OXYGEN SATURATION: 100 % | TEMPERATURE: 98 F | WEIGHT: 129 LBS

## 2024-12-11 DIAGNOSIS — A59.9 TRICHIMONIASIS: Primary | ICD-10-CM

## 2024-12-11 DIAGNOSIS — N73.0 PID (ACUTE PELVIC INFLAMMATORY DISEASE): ICD-10-CM

## 2024-12-11 DIAGNOSIS — N76.0 BACTERIAL VAGINOSIS: ICD-10-CM

## 2024-12-11 DIAGNOSIS — B96.89 BACTERIAL VAGINOSIS: ICD-10-CM

## 2024-12-11 LAB
ALBUMIN SERPL BCP-MCNC: 3.9 G/DL (ref 3.5–5.2)
ALP SERPL-CCNC: 53 U/L (ref 40–150)
ALT SERPL W/O P-5'-P-CCNC: 12 U/L (ref 10–44)
ANION GAP SERPL CALC-SCNC: 11 MMOL/L (ref 8–16)
AST SERPL-CCNC: 15 U/L (ref 10–40)
B-HCG UR QL: NEGATIVE
BACTERIA GENITAL QL WET PREP: ABNORMAL
BASOPHILS # BLD AUTO: 0.09 K/UL (ref 0–0.2)
BASOPHILS NFR BLD: 0.7 % (ref 0–1.9)
BILIRUB SERPL-MCNC: 0.3 MG/DL (ref 0.1–1)
BILIRUB UR QL STRIP: NEGATIVE
BUN SERPL-MCNC: 10 MG/DL (ref 6–20)
CALCIUM SERPL-MCNC: 9.2 MG/DL (ref 8.7–10.5)
CHLORIDE SERPL-SCNC: 107 MMOL/L (ref 95–110)
CLARITY UR: CLEAR
CLUE CELLS VAG QL WET PREP: ABNORMAL
CO2 SERPL-SCNC: 21 MMOL/L (ref 23–29)
COLOR UR: YELLOW
CREAT SERPL-MCNC: 0.7 MG/DL (ref 0.5–1.4)
DIFFERENTIAL METHOD BLD: NORMAL
EOSINOPHIL # BLD AUTO: 0.2 K/UL (ref 0–0.5)
EOSINOPHIL NFR BLD: 1.9 % (ref 0–8)
ERYTHROCYTE [DISTWIDTH] IN BLOOD BY AUTOMATED COUNT: 13.9 % (ref 11.5–14.5)
EST. GFR  (NO RACE VARIABLE): >60 ML/MIN/1.73 M^2
FILAMENT FUNGI VAG WET PREP-#/AREA: ABNORMAL
GLUCOSE SERPL-MCNC: 92 MG/DL (ref 70–110)
GLUCOSE UR QL STRIP: NEGATIVE
HCT VFR BLD AUTO: 41.2 % (ref 37–48.5)
HGB BLD-MCNC: 13.4 G/DL (ref 12–16)
HGB UR QL STRIP: NEGATIVE
IMM GRANULOCYTES # BLD AUTO: 0.04 K/UL (ref 0–0.04)
IMM GRANULOCYTES NFR BLD AUTO: 0.3 % (ref 0–0.5)
KETONES UR QL STRIP: NEGATIVE
LEUKOCYTE ESTERASE UR QL STRIP: NEGATIVE
LYMPHOCYTES # BLD AUTO: 3.8 K/UL (ref 1–4.8)
LYMPHOCYTES NFR BLD: 31.1 % (ref 18–48)
MCH RBC QN AUTO: 29.4 PG (ref 27–31)
MCHC RBC AUTO-ENTMCNC: 32.5 G/DL (ref 32–36)
MCV RBC AUTO: 90 FL (ref 82–98)
MONOCYTES # BLD AUTO: 0.7 K/UL (ref 0.3–1)
MONOCYTES NFR BLD: 5.3 % (ref 4–15)
NEUTROPHILS # BLD AUTO: 7.5 K/UL (ref 1.8–7.7)
NEUTROPHILS NFR BLD: 60.7 % (ref 38–73)
NITRITE UR QL STRIP: NEGATIVE
NRBC BLD-RTO: 0 /100 WBC
PH UR STRIP: 7 [PH] (ref 5–8)
PLATELET # BLD AUTO: 420 K/UL (ref 150–450)
PMV BLD AUTO: 10.7 FL (ref 9.2–12.9)
POTASSIUM SERPL-SCNC: 4.1 MMOL/L (ref 3.5–5.1)
PROT SERPL-MCNC: 7.9 G/DL (ref 6–8.4)
PROT UR QL STRIP: ABNORMAL
RBC # BLD AUTO: 4.56 M/UL (ref 4–5.4)
SODIUM SERPL-SCNC: 139 MMOL/L (ref 136–145)
SP GR UR STRIP: 1.03 (ref 1–1.03)
SPECIMEN SOURCE: ABNORMAL
T VAGINALIS GENITAL QL WET PREP: ABNORMAL
URN SPEC COLLECT METH UR: ABNORMAL
UROBILINOGEN UR STRIP-ACNC: NEGATIVE EU/DL
WBC # BLD AUTO: 12.29 K/UL (ref 3.9–12.7)
WBC #/AREA VAG WET PREP: ABNORMAL
YEAST GENITAL QL WET PREP: ABNORMAL

## 2024-12-11 PROCEDURE — 85025 COMPLETE CBC W/AUTO DIFF WBC: CPT

## 2024-12-11 PROCEDURE — 81003 URINALYSIS AUTO W/O SCOPE: CPT

## 2024-12-11 PROCEDURE — 87210 SMEAR WET MOUNT SALINE/INK: CPT

## 2024-12-11 PROCEDURE — 96372 THER/PROPH/DIAG INJ SC/IM: CPT

## 2024-12-11 PROCEDURE — 87591 N.GONORRHOEAE DNA AMP PROB: CPT

## 2024-12-11 PROCEDURE — 80053 COMPREHEN METABOLIC PANEL: CPT

## 2024-12-11 PROCEDURE — 99284 EMERGENCY DEPT VISIT MOD MDM: CPT | Mod: 25

## 2024-12-11 PROCEDURE — 96374 THER/PROPH/DIAG INJ IV PUSH: CPT

## 2024-12-11 PROCEDURE — 96375 TX/PRO/DX INJ NEW DRUG ADDON: CPT

## 2024-12-11 PROCEDURE — 63600175 PHARM REV CODE 636 W HCPCS

## 2024-12-11 PROCEDURE — 81025 URINE PREGNANCY TEST: CPT

## 2024-12-11 RX ORDER — DICLOFENAC SODIUM 75 MG/1
75 TABLET, DELAYED RELEASE ORAL 2 TIMES DAILY
Qty: 14 TABLET | Refills: 0 | Status: SHIPPED | OUTPATIENT
Start: 2024-12-11 | End: 2024-12-18

## 2024-12-11 RX ORDER — CEFTRIAXONE 500 MG/1
500 INJECTION, POWDER, FOR SOLUTION INTRAMUSCULAR; INTRAVENOUS
Status: COMPLETED | OUTPATIENT
Start: 2024-12-11 | End: 2024-12-11

## 2024-12-11 RX ORDER — DOXYCYCLINE 100 MG/1
100 CAPSULE ORAL 2 TIMES DAILY
Qty: 20 CAPSULE | Refills: 0 | Status: SHIPPED | OUTPATIENT
Start: 2024-12-11 | End: 2024-12-25

## 2024-12-11 RX ORDER — HYDROCODONE BITARTRATE AND ACETAMINOPHEN 5; 325 MG/1; MG/1
1 TABLET ORAL EVERY 6 HOURS PRN
Qty: 12 TABLET | Refills: 0 | Status: SHIPPED | OUTPATIENT
Start: 2024-12-11 | End: 2024-12-14

## 2024-12-11 RX ORDER — METRONIDAZOLE 500 MG/1
500 TABLET ORAL 2 TIMES DAILY
Qty: 28 TABLET | Refills: 0 | Status: SHIPPED | OUTPATIENT
Start: 2024-12-11 | End: 2024-12-25

## 2024-12-11 RX ORDER — ONDANSETRON HYDROCHLORIDE 2 MG/ML
4 INJECTION, SOLUTION INTRAVENOUS
Status: COMPLETED | OUTPATIENT
Start: 2024-12-11 | End: 2024-12-11

## 2024-12-11 RX ORDER — KETOROLAC TROMETHAMINE 30 MG/ML
30 INJECTION, SOLUTION INTRAMUSCULAR; INTRAVENOUS
Status: COMPLETED | OUTPATIENT
Start: 2024-12-11 | End: 2024-12-11

## 2024-12-11 RX ORDER — ONDANSETRON 4 MG/1
4 TABLET, ORALLY DISINTEGRATING ORAL
Status: DISCONTINUED | OUTPATIENT
Start: 2024-12-11 | End: 2024-12-11

## 2024-12-11 RX ORDER — KETOROLAC TROMETHAMINE 10 MG/1
10 TABLET, FILM COATED ORAL
Status: DISCONTINUED | OUTPATIENT
Start: 2024-12-11 | End: 2024-12-11

## 2024-12-11 RX ADMIN — KETOROLAC TROMETHAMINE 30 MG: 30 INJECTION, SOLUTION INTRAMUSCULAR at 06:12

## 2024-12-11 RX ADMIN — CEFTRIAXONE SODIUM 500 MG: 500 INJECTION, POWDER, FOR SOLUTION INTRAMUSCULAR; INTRAVENOUS at 07:12

## 2024-12-11 RX ADMIN — ONDANSETRON 4 MG: 2 INJECTION INTRAMUSCULAR; INTRAVENOUS at 06:12

## 2024-12-11 NOTE — ED PROVIDER NOTES
Encounter Date: 12/11/2024       History     Chief Complaint   Patient presents with    Abdominal Pain     Pelvic pain and spotting. States she now can feel the strings in her iud. + subjective fever      19 year old female with no PMHx on file presenting to the emergency department complaining of lower back pain, pelvic pain and vaginal spotting x3-4 days. Patient also reports subjective fever that she has been treating at home with tylenol. Patient states she has had an IUD since April 2023; patient states she feels as if she can feel the string of her IUD whenever she wipes after urinating.  Patient complaining of increased pain in her pelvic region that is unrelieved by Tylenol, ibuprofen, and Motrin.  Patient is unsure when her last menstrual period was; patient states that she spots a couple of times each month without having consistent menstrual cycles.  Patient reports being sexually active approximately 2 weeks ago.  Denies saddle anesthesia, loss of bowel function, urinary incontinence, dysuria, hematuria, hematochezia, vaginal pain, vaginal discharge, vaginal bleeding, flank pain, nausea, vomiting, diarrhea, and all other symptoms.    The history is provided by the patient.     Review of patient's allergies indicates:  No Known Allergies  History reviewed. No pertinent past medical history.  History reviewed. No pertinent surgical history.  Family History   Problem Relation Name Age of Onset    Hypertension Mother      Diabetes Mother      Asthma Mother      Migraines Sister      Asthma Sister      Asthma Brother       Social History     Tobacco Use    Smoking status: Never     Passive exposure: Never    Smokeless tobacco: Never   Substance Use Topics    Alcohol use: No    Drug use: No     Review of Systems   Constitutional:  Negative for chills and fever.   HENT:  Negative for sore throat.    Respiratory:  Negative for chest tightness and shortness of breath.    Cardiovascular:  Negative for chest pain  and palpitations.   Gastrointestinal:  Positive for abdominal pain. Negative for abdominal distention, constipation, diarrhea, nausea and vomiting.   Genitourinary:  Positive for pelvic pain and vaginal bleeding. Negative for decreased urine volume, difficulty urinating, dysuria, flank pain, frequency, hematuria, menstrual problem, urgency, vaginal discharge and vaginal pain.   Musculoskeletal:  Positive for back pain. Negative for myalgias, neck pain and neck stiffness.   Skin:  Negative for rash.   Neurological:  Negative for dizziness, syncope, weakness, light-headedness and headaches.   Hematological:  Does not bruise/bleed easily.   All other systems reviewed and are negative.      Physical Exam     Initial Vitals [12/11/24 1713]   BP Pulse Resp Temp SpO2   (!) 109/58 92 18 98.1 °F (36.7 °C) 98 %      MAP       --         Physical Exam    Nursing note reviewed.  Constitutional: She appears well-developed and well-nourished.  Non-toxic appearance. She does not have a sickly appearance. She does not appear ill. No distress.   HENT:   Head: Normocephalic and atraumatic.   Right Ear: Hearing normal.   Left Ear: Hearing normal.   Nose: Nose normal. Mouth/Throat: Uvula is midline and oropharynx is clear and moist.   Eyes: Conjunctivae, EOM and lids are normal. Pupils are equal, round, and reactive to light.   Neck: Trachea normal. Neck supple.   Normal range of motion.   Full passive range of motion without pain.     Cardiovascular:  Normal rate, regular rhythm, normal heart sounds, intact distal pulses and normal pulses.           Pulmonary/Chest: Effort normal and breath sounds normal.   Abdominal: Abdomen is soft. Bowel sounds are normal. There is abdominal tenderness in the suprapubic area.   No right CVA tenderness.  No left CVA tenderness. There is guarding. There is no rebound and no tenderness at McBurney's point. negative Rovsing's sign  Genitourinary:    Vaginal discharge (Copious white discharge in vaginal  canal) present.      Genitourinary Comments: Female chaperone present.  Upon inspection with the speculum, IUD is not in the cervix and is instead in the vaginal canal.  Cervix is closed.  No bleeding noted.  Patient declined bimanual exam; unable to assess for cervical motion tenderness and adnexal tenderness.     Musculoskeletal:         General: Normal range of motion.      Cervical back: Normal, full passive range of motion without pain, normal range of motion and neck supple.     Neurological: She is alert and oriented to person, place, and time. She has normal strength and normal reflexes. No cranial nerve deficit or sensory deficit. GCS eye subscore is 4. GCS verbal subscore is 5. GCS motor subscore is 6.   Skin: Skin is warm and dry.   Psychiatric: She has a normal mood and affect. Her behavior is normal. Thought content normal.         ED Course   Procedures  Labs Reviewed   VAGINAL SCREEN - Abnormal       Result Value    Trichomonas Moderate (*)     Clue Cells Many (*)     Budding Yeast None      Fungal Hyphae None      WBC - Vaginal Screen Few (*)     Bacteria - Vaginal Screen Few (*)     Wet Prep Source Vagina      Narrative:     Release to patient->Immediate   URINALYSIS, REFLEX TO URINE CULTURE - Abnormal    Specimen UA Urine, Clean Catch      Color, UA Yellow      Appearance, UA Clear      pH, UA 7.0      Specific Gravity, UA 1.030      Protein, UA Trace (*)     Glucose, UA Negative      Ketones, UA Negative      Bilirubin (UA) Negative      Occult Blood UA Negative      Nitrite, UA Negative      Urobilinogen, UA Negative      Leukocytes, UA Negative      Narrative:     Specimen Source->Urine   COMPREHENSIVE METABOLIC PANEL - Abnormal    Sodium 139      Potassium 4.1      Chloride 107      CO2 21 (*)     Glucose 92      BUN 10      Creatinine 0.7      Calcium 9.2      Total Protein 7.9      Albumin 3.9      Total Bilirubin 0.3      Alkaline Phosphatase 53      AST 15      ALT 12      eGFR >60       Anion Gap 11     PREGNANCY TEST, URINE RAPID    Preg Test, Ur Negative      Narrative:     Specimen Source->Urine   CBC W/ AUTO DIFFERENTIAL    WBC 12.29      RBC 4.56      Hemoglobin 13.4      Hematocrit 41.2      MCV 90      MCH 29.4      MCHC 32.5      RDW 13.9      Platelets 420      MPV 10.7      Immature Granulocytes 0.3      Gran # (ANC) 7.5      Immature Grans (Abs) 0.04      Lymph # 3.8      Mono # 0.7      Eos # 0.2      Baso # 0.09      nRBC 0      Gran % 60.7      Lymph % 31.1      Mono % 5.3      Eosinophil % 1.9      Basophil % 0.7      Differential Method Automated     C. TRACHOMATIS/N. GONORRHOEAE BY AMP DNA   C. TRACHOMATIS/N. GONORRHOEAE BY AMP DNA          Imaging Results    None          Medications   ondansetron injection 4 mg (4 mg Intravenous Given 12/11/24 1849)   ketorolac injection 30 mg (30 mg Intravenous Given 12/11/24 1850)   cefTRIAXone injection 500 mg (500 mg Intramuscular Given 12/11/24 1947)         6:40 p.m.:  Because IUD was no longer in the cervix, I used a sterile forceps and gently removed IUD from the vaginal canal; IUD removed without resistance.  Patient tolerated well.    7:09 PM:  Consult with HARDY Farris, who recommends patient be treated for suspected PID and follow up outpatient.  No need for imaging at this time.    Medical Decision Making  Differential diagnosis:  Urinary tract infection, pregnancy, pain from IUD, STI, PID.    Amount and/or Complexity of Data Reviewed  Labs: ordered.     Details: Vaginal screen shows Trichomonas, clue cells, white blood cells, and bacteria.  Urine pregnancy test negative; urinalysis negative  White blood cell count within normal limits; measured at 12.29.  All other labs unremarkable    Risk  Prescription drug management.  Risk Details: I discussed this patient with HARDY Farris, who recommends the patient be treated for PID and follow up outpatient; no need for imaging at this time.  Patient was given 500 mg ceftriaxone in  the emergency department; was prescribed doxycycline and metronidazole to be taken outpatient.  Patient was educated on completing all antibiotics and to not consume alcohol while on antibiotics.  While the patient was in the emergency room she was given Toradol for pain; however this did not relieve her pain.  Due to pain being unrelieved by over-the-counter medications, I opted to give this patient Norco 5 as needed for pain.  I reviewed this patient's  prior to prescribing Norco.  She was instructed to stop taking over the counter medications and take the medications that were prescribed today.  Patient verbalizes understanding.  All questions answered.  An OBGYN referral was placed today.    I discussed with patient that evaluation in the ED does not suggest any emergent or life threatening medical conditions requiring immediate intervention beyond what was provided in the ED, and I believe patient is safe for discharge. Regardless, an unremarkable evaluation in the ED does not preclude the development or presence of a serious of life threatening condition. As such, patient was instructed to return immediately for any worsening or change in current symptoms.                                       Clinical Impression:  Final diagnoses:  [A59.9] Trichimoniasis (Primary)  [N76.0, B96.89] Bacterial vaginosis  [N73.0] PID (acute pelvic inflammatory disease)          ED Disposition Condition    Discharge Stable          ED Prescriptions       Medication Sig Dispense Start Date End Date Auth. Provider    doxycycline (VIBRAMYCIN) 100 MG Cap Take 1 capsule (100 mg total) by mouth 2 (two) times daily. for 14 days 20 capsule 12/11/2024 12/25/2024 Thuy Swan PA-C    metroNIDAZOLE (FLAGYL) 500 MG tablet Take 1 tablet (500 mg total) by mouth 2 (two) times a day. for 14 days 28 tablet 12/11/2024 12/25/2024 Thuy Swan PA-C    diclofenac (VOLTAREN) 75 MG EC tablet Take 1 tablet (75 mg total) by mouth 2 (two)  times daily. for 7 days 14 tablet 12/11/2024 12/18/2024 Thuy Swan PA-C    HYDROcodone-acetaminophen (NORCO) 5-325 mg per tablet Take 1 tablet by mouth every 6 (six) hours as needed for Pain. 12 tablet 12/11/2024 12/14/2024 Thuy Swan PA-C          Follow-up Information       Follow up With Specialties Details Why Contact Info    [facility] - Emergency Dept. Emergency Medicine  If symptoms worsen [id number] [facility] [id number] [phone]             Thuy Swan PA-C  12/11/24 2120

## 2024-12-11 NOTE — Clinical Note
"Carina Aguilera"Dewayne was seen and treated in our emergency department on 12/11/2024.  She may return to work on 12/12/2024.       If you have any questions or concerns, please don't hesitate to call.      Thuy Swan PA-C"

## 2024-12-12 NOTE — DISCHARGE INSTRUCTIONS
If you have worsening pain, fever, increased vaginal discharge, return immediately to the emergency department.  A referral has been placed for you to follow up with OBGYN.  If you do not hear from the referral center within the next few days, please call the [facility] at [phone].

## 2024-12-16 LAB
C TRACH DNA SPEC QL NAA+PROBE: NOT DETECTED
N GONORRHOEA DNA SPEC QL NAA+PROBE: NOT DETECTED

## 2025-03-14 ENCOUNTER — TELEPHONE (OUTPATIENT)
Dept: OBSTETRICS AND GYNECOLOGY | Facility: CLINIC | Age: 20
End: 2025-03-14

## 2025-03-14 ENCOUNTER — CLINICAL SUPPORT (OUTPATIENT)
Dept: OBSTETRICS AND GYNECOLOGY | Facility: CLINIC | Age: 20
End: 2025-03-14
Payer: MEDICAID

## 2025-03-14 VITALS — HEIGHT: 65 IN | BODY MASS INDEX: 21.46 KG/M2

## 2025-03-14 DIAGNOSIS — Z32.00 POSSIBLE PREGNANCY: Primary | ICD-10-CM

## 2025-03-14 PROCEDURE — 99213 OFFICE O/P EST LOW 20 MIN: CPT | Mod: PBBFAC

## 2025-03-14 PROCEDURE — 99999 PR PBB SHADOW E&M-EST. PATIENT-LVL III: CPT | Mod: PBBFAC,,,

## 2025-03-14 NOTE — PROGRESS NOTES
Spoke with patient for approximately 30 minutes during virtual visit.  Updated chart to reflect up to date patient demographics.   Allergies, medications, pharmacy, medical, surgical and family history updated.   Substance and sexual activity, marital status, gender identity and OB/Gyn history updated.    Patient was guided through expectations of care during pregnancy.  Pregnancy confirmation, dating u/s scheduled.  New pregnancy education provided & questions answered.   Encouraged to send message or call office with any questions/concerns.   Verbalized understanding.     Discussed with pt:    Lmp 12/27/204; EGA 11 WKS  ENCOURAGED to begin taking PNV daily -PT HAS NOT STARTED A PNV  DENIES n/v:   common in 1st tri  discussed safe options per Pregnancy A to Z guide    DENIES cramping/spotting:   may be normal to have mild cramping/light spotting, jaky in 1st tri & with sexual activity  Precautions/warning signs discussed:   encouraged to go to ED for heavy vag discharge, saturating a pad, bright red bleeding, painful cramping/abd pain that is interrupting daily activity    ENCOURAGED and discussed healthy diet:   nothing raw; meat well done; heat deli meats & hot dogs prior to eating   avoid soft cheeses that are not fully pasteurized-ex: brie, feta, blue cheese   avoid fish high in mercury-limit canned tuna to once per week   rinse fruits/veggies thoroughly      ENCOURAGED adequate fluids: - PT REPORTS CONSUMING MULTIPLE COKES PER DAY, we discussed the dangers of a lot if sugar in pregnancy and how she can slow down or eliminate cokes from everyday consumption.  8-12 cups of water/healthy liquids each day  Limit caffeine to <200 mg/day (approx 1-2 cups coffee, tea or soda)     Extra rest can be beneficial, especially during the 1st trimester when it's normal to feel tired   Sleep on side rather than back or abdomen, especially as pregnancy progresses  Ok to continue to exercise but should not start anything new or  more strenuous during pregnancy    Should avoid alcohol, smoking, vaping, drugs, herbal supplements  Should check with provider regarding safety of any prescription or OTC medications  Avoid ibuprofen, motrin, advil, aleve  Ok to take regular strength tylenol for headache or body aches    Avoid cleaning cat litter box- PT REPORTS NO ANIMALS AT HOME  Wear gloves with gardening   Important to wear seat belt when riding in vehicle-lower belt should be below abdomen   Will need car seat to bring baby home from hospital        Discussed breastfeeding recommendations/benefits for mom & baby - PT PLANS TO BREASTFEED   Discussed need for pediatrician-will have to schedule baby's 1st appt prior to leaving the hospital to be seen within 2-3 days of d/c  Sent [employer] message to pt through pt portal with information regarding the [email or web address] for access to the Pregnancy A to Z guide and Prenatal Class schedule. Informed of the ConnectedMOM program, Get Ready to Meet Your Baby pamphlet, Coffective jorge and how to obtain a breast pump through insurance toward end of pregnancy

## 2025-03-15 ENCOUNTER — HOSPITAL ENCOUNTER (EMERGENCY)
Facility: HOSPITAL | Age: 20
Discharge: HOME | End: 2025-03-15
Attending: FAMILY MEDICINE
Payer: MEDICAID

## 2025-03-15 VITALS
TEMPERATURE: 99 F | HEART RATE: 90 BPM | HEIGHT: 65 IN | BODY MASS INDEX: 21.79 KG/M2 | RESPIRATION RATE: 18 BRPM | WEIGHT: 130.81 LBS | DIASTOLIC BLOOD PRESSURE: 62 MMHG | SYSTOLIC BLOOD PRESSURE: 119 MMHG | OXYGEN SATURATION: 100 %

## 2025-03-15 DIAGNOSIS — N93.9 VAGINA BLEEDING: ICD-10-CM

## 2025-03-15 DIAGNOSIS — O20.0 THREATENED MISCARRIAGE: Primary | ICD-10-CM

## 2025-03-15 DIAGNOSIS — N93.9 VAGINAL BLEEDING: ICD-10-CM

## 2025-03-15 LAB
ABO + RH BLD: NORMAL
ABO + RH BLD: NORMAL
ALBUMIN SERPL BCP-MCNC: 3.9 G/DL (ref 3.5–5.2)
ALP SERPL-CCNC: 43 U/L (ref 40–150)
ALT SERPL W/O P-5'-P-CCNC: 7 U/L (ref 10–44)
ANION GAP SERPL CALC-SCNC: 12 MMOL/L (ref 8–16)
AST SERPL-CCNC: 13 U/L (ref 10–40)
B-HCG UR QL: POSITIVE
BACTERIA #/AREA URNS HPF: ABNORMAL /HPF
BACTERIA GENITAL QL WET PREP: ABNORMAL
BASOPHILS # BLD AUTO: 0.09 K/UL (ref 0–0.2)
BASOPHILS NFR BLD: 0.8 % (ref 0–1.9)
BILIRUB SERPL-MCNC: 0.3 MG/DL (ref 0.1–1)
BILIRUB UR QL STRIP: NEGATIVE
BLD GP AB SCN CELLS X3 SERPL QL: NORMAL
BUN SERPL-MCNC: 5 MG/DL (ref 6–20)
CALCIUM SERPL-MCNC: 9 MG/DL (ref 8.7–10.5)
CHLORIDE SERPL-SCNC: 105 MMOL/L (ref 95–110)
CLARITY UR: ABNORMAL
CLUE CELLS VAG QL WET PREP: ABNORMAL
CO2 SERPL-SCNC: 19 MMOL/L (ref 23–29)
COLOR UR: YELLOW
CREAT SERPL-MCNC: 0.7 MG/DL (ref 0.5–1.4)
DIFFERENTIAL METHOD BLD: ABNORMAL
EOSINOPHIL # BLD AUTO: 0.3 K/UL (ref 0–0.5)
EOSINOPHIL NFR BLD: 2.3 % (ref 0–8)
ERYTHROCYTE [DISTWIDTH] IN BLOOD BY AUTOMATED COUNT: 12.2 % (ref 11.5–14.5)
EST. GFR  (NO RACE VARIABLE): >60 ML/MIN/1.73 M^2
FILAMENT FUNGI VAG WET PREP-#/AREA: ABNORMAL
GLUCOSE SERPL-MCNC: 85 MG/DL (ref 70–110)
GLUCOSE UR QL STRIP: NEGATIVE
HCG INTACT+B SERPL-ACNC: NORMAL MIU/ML
HCT VFR BLD AUTO: 34.6 % (ref 37–48.5)
HCV AB SERPL QL IA: NEGATIVE
HEP C VIRUS HOLD SPECIMEN: NORMAL
HGB BLD-MCNC: 11.9 G/DL (ref 12–16)
HGB UR QL STRIP: ABNORMAL
HIV 1+2 AB+HIV1 P24 AG SERPL QL IA: NEGATIVE
IMM GRANULOCYTES # BLD AUTO: 0.04 K/UL (ref 0–0.04)
IMM GRANULOCYTES NFR BLD AUTO: 0.4 % (ref 0–0.5)
KETONES UR QL STRIP: NEGATIVE
LEUKOCYTE ESTERASE UR QL STRIP: ABNORMAL
LYMPHOCYTES # BLD AUTO: 3.3 K/UL (ref 1–4.8)
LYMPHOCYTES NFR BLD: 31.2 % (ref 18–48)
MAGNESIUM SERPL-MCNC: 1.6 MG/DL (ref 1.6–2.6)
MCH RBC QN AUTO: 30.7 PG (ref 27–31)
MCHC RBC AUTO-ENTMCNC: 34.4 G/DL (ref 32–36)
MCV RBC AUTO: 89 FL (ref 82–98)
MICROSCOPIC COMMENT: ABNORMAL
MONOCYTES # BLD AUTO: 0.7 K/UL (ref 0.3–1)
MONOCYTES NFR BLD: 6.6 % (ref 4–15)
NEUTROPHILS # BLD AUTO: 6.3 K/UL (ref 1.8–7.7)
NEUTROPHILS NFR BLD: 58.7 % (ref 38–73)
NITRITE UR QL STRIP: NEGATIVE
NRBC BLD-RTO: 0 /100 WBC
PH UR STRIP: 7 [PH] (ref 5–8)
PLATELET # BLD AUTO: 333 K/UL (ref 150–450)
PMV BLD AUTO: 10.3 FL (ref 9.2–12.9)
POTASSIUM SERPL-SCNC: 3.2 MMOL/L (ref 3.5–5.1)
PROT SERPL-MCNC: 7.5 G/DL (ref 6–8.4)
PROT UR QL STRIP: ABNORMAL
RBC # BLD AUTO: 3.87 M/UL (ref 4–5.4)
RBC #/AREA URNS HPF: 13 /HPF (ref 0–4)
SODIUM SERPL-SCNC: 136 MMOL/L (ref 136–145)
SP GR UR STRIP: 1.02 (ref 1–1.03)
SPECIMEN OUTDATE: NORMAL
SPECIMEN SOURCE: ABNORMAL
SQUAMOUS #/AREA URNS HPF: 6 /HPF
T VAGINALIS GENITAL QL WET PREP: ABNORMAL
URN SPEC COLLECT METH UR: ABNORMAL
UROBILINOGEN UR STRIP-ACNC: ABNORMAL EU/DL
WBC # BLD AUTO: 10.67 K/UL (ref 3.9–12.7)
WBC #/AREA URNS HPF: 12 /HPF (ref 0–5)
WBC #/AREA VAG WET PREP: ABNORMAL
YEAST GENITAL QL WET PREP: ABNORMAL

## 2025-03-15 PROCEDURE — 81000 URINALYSIS NONAUTO W/SCOPE: CPT | Performed by: EMERGENCY MEDICINE

## 2025-03-15 PROCEDURE — 84702 CHORIONIC GONADOTROPIN TEST: CPT | Performed by: NURSE PRACTITIONER

## 2025-03-15 PROCEDURE — 63600175 PHARM REV CODE 636 W HCPCS: Performed by: FAMILY MEDICINE

## 2025-03-15 PROCEDURE — 81025 URINE PREGNANCY TEST: CPT | Performed by: EMERGENCY MEDICINE

## 2025-03-15 PROCEDURE — 99285 EMERGENCY DEPT VISIT HI MDM: CPT | Mod: 25

## 2025-03-15 PROCEDURE — 87389 HIV-1 AG W/HIV-1&-2 AB AG IA: CPT | Performed by: EMERGENCY MEDICINE

## 2025-03-15 PROCEDURE — 25000003 PHARM REV CODE 250: Performed by: FAMILY MEDICINE

## 2025-03-15 PROCEDURE — 85025 COMPLETE CBC W/AUTO DIFF WBC: CPT | Performed by: NURSE PRACTITIONER

## 2025-03-15 PROCEDURE — 80053 COMPREHEN METABOLIC PANEL: CPT | Performed by: NURSE PRACTITIONER

## 2025-03-15 PROCEDURE — 87591 N.GONORRHOEAE DNA AMP PROB: CPT | Performed by: EMERGENCY MEDICINE

## 2025-03-15 PROCEDURE — 96365 THER/PROPH/DIAG IV INF INIT: CPT

## 2025-03-15 PROCEDURE — 86900 BLOOD TYPING SEROLOGIC ABO: CPT | Mod: 91 | Performed by: NURSE PRACTITIONER

## 2025-03-15 PROCEDURE — 86803 HEPATITIS C AB TEST: CPT | Performed by: EMERGENCY MEDICINE

## 2025-03-15 PROCEDURE — 87210 SMEAR WET MOUNT SALINE/INK: CPT | Performed by: NURSE PRACTITIONER

## 2025-03-15 PROCEDURE — 86850 RBC ANTIBODY SCREEN: CPT | Performed by: FAMILY MEDICINE

## 2025-03-15 PROCEDURE — 83735 ASSAY OF MAGNESIUM: CPT | Performed by: FAMILY MEDICINE

## 2025-03-15 PROCEDURE — 87086 URINE CULTURE/COLONY COUNT: CPT | Performed by: EMERGENCY MEDICINE

## 2025-03-15 RX ORDER — HYDROCODONE BITARTRATE AND ACETAMINOPHEN 5; 325 MG/1; MG/1
1 TABLET ORAL EVERY 6 HOURS PRN
Qty: 12 TABLET | Refills: 0 | Status: SHIPPED | OUTPATIENT
Start: 2025-03-15

## 2025-03-15 RX ORDER — HYDROCODONE BITARTRATE AND ACETAMINOPHEN 5; 325 MG/1; MG/1
1 TABLET ORAL
Refills: 0 | Status: COMPLETED | OUTPATIENT
Start: 2025-03-15 | End: 2025-03-15

## 2025-03-15 RX ORDER — MAGNESIUM SULFATE HEPTAHYDRATE 40 MG/ML
2 INJECTION, SOLUTION INTRAVENOUS ONCE
Status: COMPLETED | OUTPATIENT
Start: 2025-03-15 | End: 2025-03-15

## 2025-03-15 RX ORDER — POTASSIUM CHLORIDE 20 MEQ/1
20 TABLET, EXTENDED RELEASE ORAL
Status: COMPLETED | OUTPATIENT
Start: 2025-03-15 | End: 2025-03-15

## 2025-03-15 RX ADMIN — MAGNESIUM SULFATE HEPTAHYDRATE 2 G: 40 INJECTION, SOLUTION INTRAVENOUS at 10:03

## 2025-03-15 RX ADMIN — POTASSIUM CHLORIDE 20 MEQ: 1500 TABLET, EXTENDED RELEASE ORAL at 10:03

## 2025-03-15 RX ADMIN — HYDROCODONE BITARTRATE AND ACETAMINOPHEN 1 TABLET: 5; 325 TABLET ORAL at 10:03

## 2025-03-16 NOTE — ED PROVIDER NOTES
SCRIBE #1 NOTE: I, Ashtyn Fagan, am scribing for, and in the presence of, Dave Foster MD. I have scribed the entire note.       History     Chief Complaint   Patient presents with    Vaginal Bleeding     Vaginal bleeding that started 30 minutes ago. Also c/o lower back pain since testing positive. Positive pregnancy test at home on 1/1. Third pregnancy, no history of miscarriage. OBGYN appt scheduled for 3/24     Review of patient's allergies indicates:  No Known Allergies      History of Present Illness     HPI    3/15/2025, 8:48 PM  History obtained from the patient      History of Present Illness: Carina Buchanan is a 19 y.o. female patient with no PMHx on file who presents to the Emergency Department for evaluation of vaginal bleeding which onset 30 minutes PTA. Pt was sitting outside and when she stool up, she felt a gush of blood. LMP was around 10/27/2024. Positive pregnancy test at home on 01/01/2025. Pt's third pregnancy. Pt has a 5 and 2 year old at home. No history of miscarriage. Pt denies any bleeding between November and January. Symptoms are constant and moderate in severity. No mitigating or exacerbating factors reported. Associated sxs include lower back pain, mild abdominal pain, and mild cramps. Patient denies any fever, chills, N/V, and all other sxs at this time. No prior Tx provided. Upcoming OBGYN appointment on 03/24/2025. No further complaints or concerns at this time.       Arrival mode: Personal vehicle     PCP: No, Primary Doctor        Past Medical History:  History reviewed. No pertinent past medical history.    Past Surgical History:  History reviewed. No pertinent surgical history.      Family History:  Family History   Problem Relation Name Age of Onset    Hypertension Mother      Diabetes Mother      Asthma Mother      Migraines Sister      Asthma Sister      Asthma Brother         Social History:  Social History     Tobacco Use    Smoking status: Never     Passive  exposure: Never    Smokeless tobacco: Never   Substance and Sexual Activity    Alcohol use: No    Drug use: No    Sexual activity: Yes     Partners: Male     Birth control/protection: None        Review of Systems     Review of Systems   Constitutional:  Negative for fever.   HENT:  Negative for sore throat.    Respiratory:  Negative for shortness of breath.    Cardiovascular:  Negative for chest pain.   Gastrointestinal:  Positive for abdominal pain (Mild). Negative for nausea.        (+) Cramps   Genitourinary:  Positive for vaginal bleeding. Negative for dysuria.   Musculoskeletal:  Positive for back pain (Lower).   Skin:  Negative for rash.   Neurological:  Negative for weakness.   Hematological:  Does not bruise/bleed easily.   All other systems reviewed and are negative.     Physical Exam     Initial Vitals [03/15/25 1938]   BP Pulse Resp Temp SpO2   136/74 84 16 98.5 °F (36.9 °C) 98 %      MAP       --          Physical Exam  Nursing Notes and Vital Signs Reviewed.  Constitutional: Patient is in no acute distress. Well-developed and well-nourished.  Head: Atraumatic. Normocephalic.  Eyes: PERRL. EOM intact. Conjunctivae are not pale. No scleral icterus.  ENT: Mucous membranes are moist. Oropharynx is clear and symmetric.    Neck: Supple. Full ROM. No lymphadenopathy.  Cardiovascular: Regular rate. Regular rhythm. No murmurs, rubs, or gallops. Distal pulses are 2+ and symmetric.  Pulmonary/Chest: No respiratory distress. Clear to auscultation bilaterally. No wheezing or rales.  Abdominal: Soft and non-distended.  There is no tenderness.  No rebound, guarding, or rigidity. Good bowel sounds.  Genitourinary: No CVA tenderness  Musculoskeletal: Moves all extremities. No obvious deformities. No edema. No calf tenderness. Mild abdominal tenderness to palpation.  Skin: Warm and dry.  Neurological:  Alert, awake, and appropriate.  Normal speech.  No acute focal neurological deficits are appreciated.  Psychiatric:  "Normal affect. Good eye contact. Appropriate in content.     ED Course   Procedures  ED Vital Signs:  Vitals:    03/15/25 1938 03/15/25 2044 03/15/25 2208   BP: 136/74 119/62    Pulse: 84 90    Resp: 16  18   Temp: 98.5 °F (36.9 °C)     TempSrc: Oral     SpO2: 98% 100%    Weight: 59.3 kg (130 lb 12.8 oz)     Height: 5' 5" (1.651 m)         Abnormal Lab Results:  Labs Reviewed   VAGINAL SCREEN - Abnormal       Result Value    Trichomonas None      Clue Cells Moderate (*)     Budding Yeast None      Fungal Hyphae None      WBC - Vaginal Screen Few (*)     Bacteria - Vaginal Screen Few (*)     Wet Prep Source Vagina      Narrative:     Self swab  Release to patient->Immediate   URINALYSIS, REFLEX TO URINE CULTURE - Abnormal    Specimen UA Urine, Clean Catch      Color, UA Yellow      Appearance, UA Hazy (*)     pH, UA 7.0      Specific Gravity, UA 1.020      Protein, UA Trace (*)     Glucose, UA Negative      Ketones, UA Negative      Bilirubin (UA) Negative      Occult Blood UA 3+ (*)     Nitrite, UA Negative      Urobilinogen, UA 2.0-3.0 (*)     Leukocytes, UA 1+ (*)     Narrative:     Specimen Source->Urine   PREGNANCY TEST, URINE RAPID - Abnormal    Preg Test, Ur Positive (*)     Narrative:     Specimen Source->Urine   CBC W/ AUTO DIFFERENTIAL - Abnormal    WBC 10.67      RBC 3.87 (*)     Hemoglobin 11.9 (*)     Hematocrit 34.6 (*)     MCV 89      MCH 30.7      MCHC 34.4      RDW 12.2      Platelets 333      MPV 10.3      Immature Granulocytes 0.4      Gran # (ANC) 6.3      Immature Grans (Abs) 0.04      Lymph # 3.3      Mono # 0.7      Eos # 0.3      Baso # 0.09      nRBC 0      Gran % 58.7      Lymph % 31.2      Mono % 6.6      Eosinophil % 2.3      Basophil % 0.8      Differential Method Automated      Narrative:     Release to patient->Immediate   COMPREHENSIVE METABOLIC PANEL - Abnormal    Sodium 136      Potassium 3.2 (*)     Chloride 105      CO2 19 (*)     Glucose 85      BUN 5 (*)     Creatinine 0.7      " Calcium 9.0      Total Protein 7.5      Albumin 3.9      Total Bilirubin 0.3      Alkaline Phosphatase 43      AST 13      ALT 7 (*)     eGFR >60      Anion Gap 12      Narrative:     Release to patient->Immediate   URINALYSIS MICROSCOPIC - Abnormal    RBC, UA 13 (*)     WBC, UA 12 (*)     Bacteria Rare      Squam Epithel, UA 6      Microscopic Comment SEE COMMENT      Narrative:     Specimen Source->Urine   CULTURE, URINE   HEPATITIS C ANTIBODY    Hepatitis C Ab Negative      Narrative:     Release to patient->Immediate   HEP C VIRUS HOLD SPECIMEN    HEP C Virus Hold Specimen Hold for HCV sendout      Narrative:     Release to patient->Immediate   HIV 1 / 2 ANTIBODY    HIV 1/2 Ag/Ab Negative      Narrative:     Release to patient->Immediate   HCG, QUANTITATIVE    HCG Quant [id number]      Narrative:     Release to patient->Immediate   C. TRACHOMATIS/N. GONORRHOEAE BY AMP DNA   MAGNESIUM    Magnesium 1.6     C. TRACHOMATIS/N. GONORRHOEAE BY AMP DNA   GROUP & RH    Group & Rh A POS     TYPE & SCREEN    Group & Rh A POS      Indirect Wilder NEG      Specimen Outdate 03/18/2025 23:59          All Lab Results:  Results for orders placed or performed during the hospital encounter of 03/15/25   Urinalysis, Reflex to Urine Culture Urine, Clean Catch    Collection Time: 03/15/25  7:46 PM    Specimen: Urine   Result Value Ref Range    Specimen UA Urine, Clean Catch     Color, UA Yellow Yellow, Straw, Cee    Appearance, UA Hazy (A) Clear    pH, UA 7.0 5.0 - 8.0    Specific Gravity, UA 1.020 1.005 - 1.030    Protein, UA Trace (A) Negative    Glucose, UA Negative Negative    Ketones, UA Negative Negative    Bilirubin (UA) Negative Negative    Occult Blood UA 3+ (A) Negative    Nitrite, UA Negative Negative    Urobilinogen, UA 2.0-3.0 (A) <2.0 EU/dL    Leukocytes, UA 1+ (A) Negative   Pregnancy, urine rapid (UPT)    Collection Time: 03/15/25  7:46 PM   Result Value Ref Range    Preg Test, Ur Positive (A)    Urinalysis Microscopic     Collection Time: 03/15/25  7:46 PM   Result Value Ref Range    RBC, UA 13 (H) 0 - 4 /hpf    WBC, UA 12 (H) 0 - 5 /hpf    Bacteria Rare None-Occ /hpf    Squam Epithel, UA 6 /hpf    Microscopic Comment SEE COMMENT    Hepatitis C Antibody    Collection Time: 03/15/25  7:50 PM   Result Value Ref Range    Hepatitis C Ab Negative Negative   HCV Virus Hold Specimen    Collection Time: 03/15/25  7:50 PM   Result Value Ref Range    HEP C Virus Hold Specimen Hold for HCV sendout    HIV 1/2 Ag/Ab (4th Gen)    Collection Time: 03/15/25  7:50 PM   Result Value Ref Range    HIV 1/2 Ag/Ab Negative Negative   CBC W/ AUTO DIFFERENTIAL    Collection Time: 03/15/25  7:50 PM   Result Value Ref Range    WBC 10.67 3.90 - 12.70 K/uL    RBC 3.87 (L) 4.00 - 5.40 M/uL    Hemoglobin 11.9 (L) 12.0 - 16.0 g/dL    Hematocrit 34.6 (L) 37.0 - 48.5 %    MCV 89 82 - 98 fL    MCH 30.7 27.0 - 31.0 pg    MCHC 34.4 32.0 - 36.0 g/dL    RDW 12.2 11.5 - 14.5 %    Platelets 333 150 - 450 K/uL    MPV 10.3 9.2 - 12.9 fL    Immature Granulocytes 0.4 0.0 - 0.5 %    Gran # (ANC) 6.3 1.8 - 7.7 K/uL    Immature Grans (Abs) 0.04 0.00 - 0.04 K/uL    Lymph # 3.3 1.0 - 4.8 K/uL    Mono # 0.7 0.3 - 1.0 K/uL    Eos # 0.3 0.0 - 0.5 K/uL    Baso # 0.09 0.00 - 0.20 K/uL    nRBC 0 0 /100 WBC    Gran % 58.7 38.0 - 73.0 %    Lymph % 31.2 18.0 - 48.0 %    Mono % 6.6 4.0 - 15.0 %    Eosinophil % 2.3 0.0 - 8.0 %    Basophil % 0.8 0.0 - 1.9 %    Differential Method Automated    Comp. Metabolic Panel    Collection Time: 03/15/25  7:50 PM   Result Value Ref Range    Sodium 136 136 - 145 mmol/L    Potassium 3.2 (L) 3.5 - 5.1 mmol/L    Chloride 105 95 - 110 mmol/L    CO2 19 (L) 23 - 29 mmol/L    Glucose 85 70 - 110 mg/dL    BUN 5 (L) 6 - 20 mg/dL    Creatinine 0.7 0.5 - 1.4 mg/dL    Calcium 9.0 8.7 - 10.5 mg/dL    Total Protein 7.5 6.0 - 8.4 g/dL    Albumin 3.9 3.5 - 5.2 g/dL    Total Bilirubin 0.3 0.1 - 1.0 mg/dL    Alkaline Phosphatase 43 40 - 150 U/L    AST 13 10 - 40 U/L     ALT 7 (L) 10 - 44 U/L    eGFR >60 >60 mL/min/1.73 m^2    Anion Gap 12 8 - 16 mmol/L   hCG, quantitative    Collection Time: 03/15/25  7:50 PM   Result Value Ref Range    HCG Quant [id number] See Text mIU/mL   ABO/Rh    Collection Time: 03/15/25  7:50 PM   Result Value Ref Range    Group & Rh A POS    Magnesium    Collection Time: 03/15/25  9:11 PM   Result Value Ref Range    Magnesium 1.6 1.6 - 2.6 mg/dL   Type & Screen    Collection Time: 03/15/25  9:11 PM   Result Value Ref Range    Group & Rh A POS     Indirect Wilder NEG     Specimen Outdate 03/18/2025 23:59    Vaginal Screen    Collection Time: 03/15/25 10:21 PM    Specimen: Vagina; Genital   Result Value Ref Range    Trichomonas None None    Clue Cells Moderate (A) None    Budding Yeast None None    Fungal Hyphae None None    WBC - Vaginal Screen Few (A) None    Bacteria - Vaginal Screen Few (A) None    Wet Prep Source Vagina        Imaging Results:  Imaging Results              US OB <14 Wks TransAbd & TransVag, Single Gestation (XPD) (Final result)  Result time 03/15/25 21:09:09   Procedure changed from US OB <14 Wks, TransAbd, Single Gestation     Final result by Emanuel Marin MD (03/15/25 21:09:09)                   Impression:     Findings strongly concerning for nonviable gestation but not definitive.      Finalized on: 3/15/2025 9:09 PM By:  Emanuel Marin MD  [facility]# [id number]      2025-03-15 21:11:10.387     [facility]               Narrative:    EXAM: US OB <14 WEEKS, TRANSABDOM \T\ TRANSVAG, SINGLE GESTATION (XPD)  \  CLINICAL HISTORY:    TECHNIQUE: Complete OB ultrasound less than 14 weeks gestation    FINDINGS: Abnormal lobulated gestational sac measuring 2.3 cm in average dimension.  Crown-rump length is 0.4 mm.  No fetal heart rate identified.  No sac is identified.  Findings strongly concerning for nonviable gestation, but not definitive.    No subchorionic hemorrhage or other acute complication is seen.  Survey of the maternal uterus demonstrates no  myometrial mass or other significant uterine abnormalities.  Survey of the adnexa demonstrates no evidence of ectopic pregnancy or other suspicious adnexal mass.  Right ovary not visualized.  4.6 cm left ovarian simple cyst.  No free fluid is seen in the pelvic cul-de-sac.                                              The Emergency Provider reviewed the vital signs and test results, which are outlined above.     ED Discussion     9:27 PM: Discussed pt's case with Dr. Christy Lombardi MD (OB/GYN) who recommends if the pt is stable, discharge with pain medication like Norco and follow-up on Monday. Dr. Lombardi believes findings are concerning for miscarriage but not to make a definite diagnosis from the ultrasound due to crown rump length is only 4 mm and no prior ultrasound or HCG level done.    10:45 PM: Reassessed pt at this time. Discussed with patient and/or family/caretaker all pertinent ED information and results. Discussed pt dx and plan of tx. Gave the patient all f/u and return to the ED instructions. All questions and concerns were addressed at this time. Patient and/or family/caretaker expresses understanding of information and instructions, and is comfortable with plan to discharge. Pt is stable for discharge.     I discussed with patient and/or family/caretaker that evaluation in the ED does not suggest any emergent or life threatening medical conditions requiring immediate intervention beyond what was provided in the ED, and I believe patient is safe for discharge.  Regardless, an unremarkable evaluation in the ED does not preclude the development or presence of a serious of life threatening condition. As such, I instructed that the patient is to return immediately for any worsening or change in current symptoms.    ED Course as of 03/16/25 0156   Sat Mar 15, 2025   2128 Beta HCG Quant: [id number] [MR]      ED Course User Index  [MR] Dave Foster MD     Medical Decision Making  19-year-old G3  P2 presents with vaginal bleeding.  Ultrasound confirmed gestational sac without a fetal pole.  White possible this patient is a only 6 weeks pregnant.  Her last menstrual period was in November, she had a home pregnancy test that was positive in January.  That would put her about 16-17 weeks gestation.  This is not correlate with her physical findings.  I suspect that she is early pregnancy.    Discussed with OBGYN on call.  She will be advised to follow up with OB on Monday.  At this time she is not having excessive bleeding or excessive pain.    Magnesium was low which was replaced.  Potassium was low which was replaced.      She will be discharged in stable condition.  Advised to follow up for worsening abdominal pain or bleeding.  Patient education regarding vaginal bleeding in pregnancy and threatened miscarriage.  Pelvic rest recommended.    Amount and/or Complexity of Data Reviewed  Labs: ordered. Decision-making details documented in ED Course.  Radiology: ordered. Decision-making details documented in ED Course.    Risk  Prescription drug management.                ED Medication(s):  Medications   magnesium sulfate 2g in water 50mL IVPB (premix) (0 g Intravenous Stopped 3/15/25 0518)   HYDROcodone-acetaminophen 5-325 mg per tablet 1 tablet (1 tablet Oral Given 3/15/25 2208)   potassium chloride SA CR tablet 20 mEq (20 mEq Oral Given 3/15/25 2222)       Discharge Medication List as of 3/15/2025 10:23 PM        START taking these medications    Details   !! HYDROcodone-acetaminophen (NORCO) 5-325 mg per tablet Take 1 tablet by mouth every 6 (six) hours as needed for Pain., Starting Sat 3/15/2025, Print       !! - Potential duplicate medications found. Please discuss with provider.           Follow-up Information       Christy Lombardi MD In 2 days.    Specialties: Obstetrics, Obstetrics and Gynecology  Contact information:  [address] [place] [address] [address] [place] [address] [phone]                                  Scribe Attestation:   Scribe #1: I performed the above scribed service and the documentation accurately describes the services I performed. I attest to the accuracy of the note.     Attending:   Physician Attestation Statement for Scribe #1: I, Dave Foster MD, personally performed the services described in this documentation, as scribed by Ashtyn Fagan, in my presence, and it is both accurate and complete.           Clinical Impression       ICD-10-CM ICD-9-CM   1. Threatened miscarriage  O20.0 640.00   2. Vagina bleeding  N93.9 623.8   3. Vaginal bleeding  N93.9 623.8       Disposition:   Disposition: Discharged  Condition: Stable       Dave Foster MD  03/16/25 0156

## 2025-03-17 LAB
BACTERIA UR CULT: NORMAL
C TRACH DNA SPEC QL NAA+PROBE: NOT DETECTED
N GONORRHOEA DNA SPEC QL NAA+PROBE: NOT DETECTED

## 2025-03-18 ENCOUNTER — OFFICE VISIT (OUTPATIENT)
Dept: OBSTETRICS AND GYNECOLOGY | Facility: CLINIC | Age: 20
End: 2025-03-18
Payer: MEDICAID

## 2025-03-18 ENCOUNTER — PROCEDURE VISIT (OUTPATIENT)
Dept: OBSTETRICS AND GYNECOLOGY | Facility: CLINIC | Age: 20
End: 2025-03-18
Payer: MEDICAID

## 2025-03-18 ENCOUNTER — PATIENT MESSAGE (OUTPATIENT)
Dept: OBSTETRICS AND GYNECOLOGY | Facility: CLINIC | Age: 20
End: 2025-03-18

## 2025-03-18 ENCOUNTER — LAB VISIT (OUTPATIENT)
Dept: LAB | Facility: HOSPITAL | Age: 20
End: 2025-03-18
Attending: ADVANCED PRACTICE MIDWIFE
Payer: MEDICAID

## 2025-03-18 VITALS
SYSTOLIC BLOOD PRESSURE: 104 MMHG | HEIGHT: 65 IN | DIASTOLIC BLOOD PRESSURE: 70 MMHG | WEIGHT: 130.31 LBS | BODY MASS INDEX: 21.71 KG/M2

## 2025-03-18 DIAGNOSIS — Z32.00 POSSIBLE PREGNANCY: ICD-10-CM

## 2025-03-18 DIAGNOSIS — O36.80X0 PREGNANCY WITH INCONCLUSIVE FETAL VIABILITY, SINGLE OR UNSPECIFIED FETUS: Primary | ICD-10-CM

## 2025-03-18 DIAGNOSIS — O36.80X0 PREGNANCY WITH INCONCLUSIVE FETAL VIABILITY, SINGLE OR UNSPECIFIED FETUS: ICD-10-CM

## 2025-03-18 PROCEDURE — 99999 PR PBB SHADOW E&M-EST. PATIENT-LVL III: CPT | Mod: PBBFAC,,, | Performed by: ADVANCED PRACTICE MIDWIFE

## 2025-03-18 PROCEDURE — 3008F BODY MASS INDEX DOCD: CPT | Mod: CPTII,,, | Performed by: ADVANCED PRACTICE MIDWIFE

## 2025-03-18 PROCEDURE — 3078F DIAST BP <80 MM HG: CPT | Mod: CPTII,,, | Performed by: ADVANCED PRACTICE MIDWIFE

## 2025-03-18 PROCEDURE — 36415 COLL VENOUS BLD VENIPUNCTURE: CPT | Performed by: ADVANCED PRACTICE MIDWIFE

## 2025-03-18 PROCEDURE — 84702 CHORIONIC GONADOTROPIN TEST: CPT | Performed by: ADVANCED PRACTICE MIDWIFE

## 2025-03-18 PROCEDURE — 99213 OFFICE O/P EST LOW 20 MIN: CPT | Mod: PBBFAC,TH | Performed by: ADVANCED PRACTICE MIDWIFE

## 2025-03-18 PROCEDURE — 76801 OB US < 14 WKS SINGLE FETUS: CPT | Mod: PBBFAC | Performed by: OBSTETRICS & GYNECOLOGY

## 2025-03-18 PROCEDURE — 1159F MED LIST DOCD IN RCRD: CPT | Mod: CPTII,,, | Performed by: ADVANCED PRACTICE MIDWIFE

## 2025-03-18 PROCEDURE — 99214 OFFICE O/P EST MOD 30 MIN: CPT | Mod: TH,S$PBB,UC, | Performed by: ADVANCED PRACTICE MIDWIFE

## 2025-03-18 PROCEDURE — 3074F SYST BP LT 130 MM HG: CPT | Mod: CPTII,,, | Performed by: ADVANCED PRACTICE MIDWIFE

## 2025-03-18 NOTE — PROGRESS NOTES
Carina Buchanan  complains of amenorrhea.  Patient's last menstrual period was 2024 (exact date).. She went to the ER with bleeding and mild cramping 3 days ago. She described the bleeding as a heavy period like flow. She is no longer bleeding and having some very mild cramping.     History reviewed. No pertinent past medical history.  History reviewed. No pertinent surgical history.  Family History   Problem Relation Name Age of Onset    Hypertension Mother      Diabetes Mother      Asthma Mother      Migraines Sister      Asthma Sister      Asthma Brother       Review of patient's allergies indicates:   Allergen Reactions    Amoxicillin Hives and Itching     Social History[1]    ROS:  GENERAL: No fever, chills, fatigability or weight loss.  VULVAR: No pain, no lesions and no itching.  VAGINAL: No relaxation, no itching, no discharge, no abnormal bleeding and no lesions.  ABDOMEN: No abdominal pain. Denies nausea. Denies vomiting. No diarrhea. No constipation  BREAST: Denies pain. No lumps. No discharge.  URINARY: No incontinence, no nocturia, no frequency and no dysuria.  CARDIOVASCULAR: No chest pain. No shortness of breath. No leg cramps.  NEUROLOGICAL: no headaches. No vision changes.      Vitals:    25 1351   BP: 104/70     GENERAL: healthy, alert, no distress  US today shows GS that is suspicious for MAB vs myometrial ectopic pregnancy.         Diagnoses and all orders for this visit:    Pregnancy with inconclusive fetal viability, single or unspecified fetus  -     HCG, QUANTITATIVE, PREGNANCY; Standing  -     US OB/GYN Procedure ([facility]); Future        Discussed POC with Dr. Lucas. Will do trending beta Hcg and f/u in 1 week with US. If MAB dx at that time, will offer D&C.   STRICT bleeding/infection/and rupture precautions given. Discussed with increased pain, to go to ER.          [1]   Social History  Socioeconomic History    Marital status: Single   Tobacco Use    Smoking  status: Never     Passive exposure: Never    Smokeless tobacco: Never   Substance and Sexual Activity    Alcohol use: No    Drug use: No    Sexual activity: Yes     Partners: Male     Birth control/protection: None   Social History Narrative    ** Merged History Encounter **

## 2025-03-19 ENCOUNTER — RESULTS FOLLOW-UP (OUTPATIENT)
Dept: OBSTETRICS AND GYNECOLOGY | Facility: HOSPITAL | Age: 20
End: 2025-03-19

## 2025-03-19 ENCOUNTER — TELEPHONE (OUTPATIENT)
Dept: OBSTETRICS AND GYNECOLOGY | Facility: HOSPITAL | Age: 20
End: 2025-03-19
Payer: MEDICAID

## 2025-03-19 LAB — HCG INTACT+B SERPL-ACNC: NORMAL MIU/ML

## 2025-03-19 NOTE — TELEPHONE ENCOUNTER
Called patient to discuss recent message about pain and beta hcg levels. Reviewed case with Dr. Seo.   Beta Hcg levels are dropping which is consistent with Miscarriage. Continue to monitor pain, and if pain worsens again, come directly to ER. Patient currently not hurting or bleeding. She only had some light spotting last night.

## 2025-03-24 ENCOUNTER — TELEPHONE (OUTPATIENT)
Dept: OBSTETRICS AND GYNECOLOGY | Facility: CLINIC | Age: 20
End: 2025-03-24
Payer: MEDICAID

## 2025-03-24 NOTE — TELEPHONE ENCOUNTER
Called patient to assist in rescheduling missed appointments today. No answer. Left message for patient to return call to clinic.

## 2025-06-18 ENCOUNTER — PATIENT MESSAGE (OUTPATIENT)
Dept: RESEARCH | Facility: HOSPITAL | Age: 20
End: 2025-06-18
Payer: MEDICAID